# Patient Record
Sex: MALE | Race: WHITE | NOT HISPANIC OR LATINO | Employment: FULL TIME | URBAN - METROPOLITAN AREA
[De-identification: names, ages, dates, MRNs, and addresses within clinical notes are randomized per-mention and may not be internally consistent; named-entity substitution may affect disease eponyms.]

---

## 2017-11-13 ENCOUNTER — HOSPITAL ENCOUNTER (OUTPATIENT)
Dept: LAB | Facility: MEDICAL CENTER | Age: 32
End: 2017-11-13
Attending: FAMILY MEDICINE
Payer: COMMERCIAL

## 2017-11-13 LAB
ALBUMIN SERPL BCP-MCNC: 4.9 G/DL (ref 3.2–4.9)
ALBUMIN/GLOB SERPL: 1.7 G/DL
ALP SERPL-CCNC: 70 U/L (ref 30–99)
ALT SERPL-CCNC: 31 U/L (ref 2–50)
ANION GAP SERPL CALC-SCNC: 11 MMOL/L (ref 0–11.9)
AST SERPL-CCNC: 22 U/L (ref 12–45)
BASOPHILS # BLD AUTO: 0.6 % (ref 0–1.8)
BASOPHILS # BLD: 0.05 K/UL (ref 0–0.12)
BILIRUB SERPL-MCNC: 1 MG/DL (ref 0.1–1.5)
BUN SERPL-MCNC: 19 MG/DL (ref 8–22)
CALCIUM SERPL-MCNC: 9.5 MG/DL (ref 8.5–10.5)
CHLORIDE SERPL-SCNC: 104 MMOL/L (ref 96–112)
CHOLEST SERPL-MCNC: 220 MG/DL (ref 100–199)
CO2 SERPL-SCNC: 22 MMOL/L (ref 20–33)
CREAT SERPL-MCNC: 1.06 MG/DL (ref 0.5–1.4)
CRP SERPL HS-MCNC: 0.9 MG/L (ref 0–7.5)
EOSINOPHIL # BLD AUTO: 0.11 K/UL (ref 0–0.51)
EOSINOPHIL NFR BLD: 1.3 % (ref 0–6.9)
ERYTHROCYTE [DISTWIDTH] IN BLOOD BY AUTOMATED COUNT: 38.9 FL (ref 35.9–50)
GFR SERPL CREATININE-BSD FRML MDRD: >60 ML/MIN/1.73 M 2
GLOBULIN SER CALC-MCNC: 2.9 G/DL (ref 1.9–3.5)
GLUCOSE SERPL-MCNC: 88 MG/DL (ref 65–99)
HCT VFR BLD AUTO: 46.4 % (ref 42–52)
HDLC SERPL-MCNC: 46 MG/DL
HGB BLD-MCNC: 16.5 G/DL (ref 14–18)
IMM GRANULOCYTES # BLD AUTO: 0.07 K/UL (ref 0–0.11)
IMM GRANULOCYTES NFR BLD AUTO: 0.8 % (ref 0–0.9)
LDLC SERPL CALC-MCNC: ABNORMAL MG/DL
LYMPHOCYTES # BLD AUTO: 2.3 K/UL (ref 1–4.8)
LYMPHOCYTES NFR BLD: 26.3 % (ref 22–41)
MAGNESIUM SERPL-MCNC: 2 MG/DL (ref 1.5–2.5)
MCH RBC QN AUTO: 32.2 PG (ref 27–33)
MCHC RBC AUTO-ENTMCNC: 35.6 G/DL (ref 33.7–35.3)
MCV RBC AUTO: 90.6 FL (ref 81.4–97.8)
MONOCYTES # BLD AUTO: 0.94 K/UL (ref 0–0.85)
MONOCYTES NFR BLD AUTO: 10.7 % (ref 0–13.4)
NEUTROPHILS # BLD AUTO: 5.29 K/UL (ref 1.82–7.42)
NEUTROPHILS NFR BLD: 60.3 % (ref 44–72)
NRBC # BLD AUTO: 0 K/UL
NRBC BLD AUTO-RTO: 0 /100 WBC
PLATELET # BLD AUTO: 257 K/UL (ref 164–446)
PMV BLD AUTO: 10.9 FL (ref 9–12.9)
POTASSIUM SERPL-SCNC: 3.8 MMOL/L (ref 3.6–5.5)
PROT SERPL-MCNC: 7.8 G/DL (ref 6–8.2)
RBC # BLD AUTO: 5.12 M/UL (ref 4.7–6.1)
SODIUM SERPL-SCNC: 137 MMOL/L (ref 135–145)
TRIGL SERPL-MCNC: 621 MG/DL (ref 0–149)
WBC # BLD AUTO: 8.8 K/UL (ref 4.8–10.8)

## 2017-11-13 PROCEDURE — 80061 LIPID PANEL: CPT

## 2017-11-13 PROCEDURE — 86141 C-REACTIVE PROTEIN HS: CPT

## 2017-11-13 PROCEDURE — 83735 ASSAY OF MAGNESIUM: CPT

## 2017-11-13 PROCEDURE — 85025 COMPLETE CBC W/AUTO DIFF WBC: CPT

## 2017-11-13 PROCEDURE — 36415 COLL VENOUS BLD VENIPUNCTURE: CPT

## 2017-11-13 PROCEDURE — 80053 COMPREHEN METABOLIC PANEL: CPT

## 2021-12-17 ENCOUNTER — OFFICE VISIT (OUTPATIENT)
Dept: URGENT CARE | Facility: PHYSICIAN GROUP | Age: 36
End: 2021-12-17
Payer: COMMERCIAL

## 2021-12-17 VITALS
WEIGHT: 239 LBS | HEIGHT: 72 IN | BODY MASS INDEX: 32.37 KG/M2 | OXYGEN SATURATION: 99 % | HEART RATE: 93 BPM | SYSTOLIC BLOOD PRESSURE: 178 MMHG | DIASTOLIC BLOOD PRESSURE: 130 MMHG | TEMPERATURE: 99.5 F | RESPIRATION RATE: 16 BRPM

## 2021-12-17 DIAGNOSIS — I15.9 SECONDARY HYPERTENSION: ICD-10-CM

## 2021-12-17 DIAGNOSIS — B96.89 ACUTE BACTERIAL SINUSITIS: ICD-10-CM

## 2021-12-17 DIAGNOSIS — J01.90 ACUTE BACTERIAL SINUSITIS: ICD-10-CM

## 2021-12-17 PROBLEM — I10 HYPERTENSION: Status: ACTIVE | Noted: 2017-11-10

## 2021-12-17 PROBLEM — E78.5 DYSLIPIDEMIA: Status: ACTIVE | Noted: 2017-11-10

## 2021-12-17 PROCEDURE — 99213 OFFICE O/P EST LOW 20 MIN: CPT | Performed by: PHYSICIAN ASSISTANT

## 2021-12-17 RX ORDER — DEXTROMETHORPHAN HYDROBROMIDE AND PROMETHAZINE HYDROCHLORIDE 15; 6.25 MG/5ML; MG/5ML
5 SYRUP ORAL EVERY 6 HOURS PRN
Qty: 118 ML | Refills: 0 | Status: SHIPPED | OUTPATIENT
Start: 2021-12-17 | End: 2021-12-24

## 2021-12-17 RX ORDER — AMOXICILLIN 500 MG/1
500 CAPSULE ORAL 2 TIMES DAILY
Qty: 14 CAPSULE | Refills: 0 | Status: SHIPPED | OUTPATIENT
Start: 2021-12-17 | End: 2021-12-24

## 2021-12-17 RX ORDER — LISINOPRIL 10 MG/1
10 TABLET ORAL DAILY
COMMUNITY
End: 2022-08-31 | Stop reason: SDUPTHER

## 2021-12-17 ASSESSMENT — ENCOUNTER SYMPTOMS
SHORTNESS OF BREATH: 0
DIARRHEA: 0
HEADACHES: 0
NAUSEA: 0
VOMITING: 0
EYE PAIN: 0
MYALGIAS: 0
CONSTIPATION: 0
SINUS PAIN: 1
CHILLS: 0
COUGH: 1
SORE THROAT: 0
ABDOMINAL PAIN: 0
FEVER: 0

## 2021-12-17 NOTE — PROGRESS NOTES
Subjective:   Jaydon Oglesby is a 36 y.o. male who presents for Cough (Productibve cough, post nasal drip. Onset 2 weeks. )      36-year-old male presents with a 2-week history of symptoms, began with more nonspecific URI however he has been left with worsening sinus pressure, congestion, postnasal drip.  He frequently coughs up his nasal drip.  Has not noted any fevers or chills.  He is noticed some mild left-sided ear fullness.  He denies any chest pain, shortness of breath, dizziness, tinnitus      Review of Systems   Constitutional: Negative for chills and fever.   HENT: Positive for congestion and sinus pain. Negative for ear pain and sore throat.    Eyes: Negative for pain.   Respiratory: Positive for cough. Negative for shortness of breath.    Cardiovascular: Negative for chest pain.   Gastrointestinal: Negative for abdominal pain, constipation, diarrhea, nausea and vomiting.   Genitourinary: Negative for dysuria.   Musculoskeletal: Negative for myalgias.   Skin: Negative for rash.   Neurological: Negative for headaches.       Medications, Allergies, and current problem list reviewed today in Epic.     Objective:     BP (!) 178/130 (BP Location: Left arm, Patient Position: Sitting, BP Cuff Size: Large adult)   Pulse 93   Temp 37.5 °C (99.5 °F) (Temporal)   Resp 16   Ht 1.829 m (6')   Wt 108 kg (239 lb)   SpO2 99%     Physical Exam  Vitals reviewed.   Constitutional:       Appearance: Normal appearance.   HENT:      Head: Normocephalic and atraumatic.      Right Ear: Tympanic membrane, ear canal and external ear normal.      Left Ear: Tympanic membrane, ear canal and external ear normal.      Nose: Congestion and rhinorrhea present.      Comments: Maxillary sinus TTP     Mouth/Throat:      Mouth: Mucous membranes are moist.      Pharynx: No oropharyngeal exudate or posterior oropharyngeal erythema.      Comments: POST NASAL DRIP  Eyes:      Conjunctiva/sclera: Conjunctivae normal.   Cardiovascular:       Rate and Rhythm: Normal rate and regular rhythm.      Heart sounds: Normal heart sounds.   Pulmonary:      Effort: Pulmonary effort is normal.      Breath sounds: Normal breath sounds.   Lymphadenopathy:      Cervical: No cervical adenopathy.   Skin:     General: Skin is warm and dry.      Capillary Refill: Capillary refill takes less than 2 seconds.   Neurological:      Mental Status: He is alert and oriented to person, place, and time.         Assessment/Plan:     Diagnosis and associated orders:     1. Acute bacterial sinusitis  promethazine-dextromethorphan (PROMETHAZINE-DM) 6.25-15 MG/5ML syrup    amoxicillin (AMOXIL) 500 MG Cap   2. Secondary hypertension        Comments/MDM:     • Greater than 10 days of symptoms with worsening facial pressure and pain consistent with acute bacterial rhinosinusitis.  Patient with bothersome cough, will trial promethazine cough syrup, patient cautioned about the sedating side effects of this  • Patient with an alarmingly high blood pressure in clinic, he reports that he has severe whitecoat hypertension.  He actually has been monitoring his blood pressure at home and is consistently 130s over 90s.  Patient has no evidence of endorgan dysfunction and his symptoms are inconsistent with a venous sinus thrombosis or intracranial hypertension or another pathology.  I recommended the patient consider ER presentation if when he goes home he checks his blood pressure remains elevated in his uncontrolled setting.  He is quite nervous seeming.  Patient understands the risk of hypertension and the endorgan injury that can result from uncontrolled hypertensive emergency as he has been dealing with this for a number of years.  I recommend close follow-up with his primary care provider.  I offered ER evaluation at this time however he feels very strongly all his life he has had whitecoat hypertension         Differential diagnosis, natural history, supportive care, and indications for  immediate follow-up discussed.    Advised the patient to follow-up with the primary care physician for recheck, reevaluation, and consideration of further management.    Please note that this dictation was created using voice recognition software. I have made a reasonable attempt to correct obvious errors, but I expect that there are errors of grammar and possibly content that I did not discover before finalizing the note.    This note was electronically signed by Francesco Fuentes PA-C

## 2022-01-19 RX ORDER — AZITHROMYCIN 250 MG/1
TABLET, FILM COATED ORAL
Qty: 6 TABLET | Refills: 0 | Status: SHIPPED | OUTPATIENT
Start: 2022-01-19 | End: 2022-11-02

## 2022-01-19 NOTE — PROGRESS NOTES
Calling in Z-pack to pharmacy for him in case he gets bronchitis with current URI. Still waiting on his Covid test results from yesterday.

## 2022-01-26 PROBLEM — Z82.49 FAMILY HISTORY OF PREMATURE CORONARY ARTERY DISEASE: Status: ACTIVE | Noted: 2022-01-26

## 2022-07-21 ENCOUNTER — APPOINTMENT (OUTPATIENT)
Dept: MEDICAL GROUP | Facility: OTHER | Age: 37
End: 2022-07-21
Payer: COMMERCIAL

## 2022-08-31 ENCOUNTER — OFFICE VISIT (OUTPATIENT)
Dept: MEDICAL GROUP | Facility: OTHER | Age: 37
End: 2022-08-31
Payer: COMMERCIAL

## 2022-08-31 VITALS
HEIGHT: 72 IN | TEMPERATURE: 97.1 F | SYSTOLIC BLOOD PRESSURE: 146 MMHG | WEIGHT: 240 LBS | RESPIRATION RATE: 16 BRPM | OXYGEN SATURATION: 99 % | HEART RATE: 110 BPM | DIASTOLIC BLOOD PRESSURE: 98 MMHG | BODY MASS INDEX: 32.51 KG/M2

## 2022-08-31 DIAGNOSIS — Z82.49 FAMILY HISTORY OF PREMATURE CORONARY ARTERY DISEASE: ICD-10-CM

## 2022-08-31 DIAGNOSIS — Z30.09 FAMILY PLANNING COUNSELING: ICD-10-CM

## 2022-08-31 DIAGNOSIS — I10 PRIMARY HYPERTENSION: ICD-10-CM

## 2022-08-31 DIAGNOSIS — Z13.1 SCREENING FOR DIABETES MELLITUS: ICD-10-CM

## 2022-08-31 DIAGNOSIS — E78.5 DYSLIPIDEMIA: ICD-10-CM

## 2022-08-31 PROCEDURE — 99204 OFFICE O/P NEW MOD 45 MIN: CPT | Performed by: FAMILY MEDICINE

## 2022-08-31 RX ORDER — LISINOPRIL 10 MG/1
10 TABLET ORAL DAILY
Qty: 90 TABLET | Refills: 3 | Status: SHIPPED | OUTPATIENT
Start: 2022-08-31 | End: 2023-03-08 | Stop reason: SDUPTHER

## 2022-08-31 ASSESSMENT — PATIENT HEALTH QUESTIONNAIRE - PHQ9: CLINICAL INTERPRETATION OF PHQ2 SCORE: 0

## 2022-08-31 NOTE — PROGRESS NOTES
[facility] MEDICINE     PATIENT ID:  NAME:  Jaydon Oglesby  MRN:               [id number]  YOB: 1985    Date: 8:58 AM      CC:  re-establish care, blood pressure      HPI: Jaydon Oglesby is a 37 y.o. male who presented with multiple concerns.  Last seen about four years ago.     His father passed in early January at age 66.  It really depressed him for about six months.  Doing fine now.     Problem   Family Planning Counseling    Interested in a vasectomy.  They have 2 children ages 6 and 2.      Family History of Premature Coronary Artery Disease    HIs father  just in January from heart attack; he had a prior CABG in his early fifties.  His grandfather  at age 52 of heart disease.      Dyslipidemia    Has not had any recent labs.  Has never been on a statin.  Father had premature coronary artery disease.  Has started paying more attention to his diet in last month or so.      Hypertension    Has had hypertension for several years and was started on Lisinopril 10 mg which he ran out of a year ago.  Has bene taking nothing.  HIs father and mother both have had hypertension.  Father  earlier this year of heart disease.  No recent labs.           REVIEW OF SYSTEMS:   Ten systems reviewed and were negative except as noted in the HPI.                PROBLEM LIST  Patient Active Problem List   Diagnosis    Deviated nasal septum    Dyslipidemia    Hypertension    Family history of premature coronary artery disease    Family planning counseling        PAST SURGICAL HISTORY:  History reviewed. No pertinent surgical history.    FAMILY HISTORY:  History reviewed. No pertinent family history.    SOCIAL HISTORY:   Social History     Tobacco Use    Smoking status: Never    Smokeless tobacco: Never   Substance Use Topics    Alcohol use: Yes     Alcohol/week: 3.0 oz     Types: 5 Standard drinks or equivalent per week       ALLERGIES:  Allergies   Allergen Reactions    Ceclor [Cefaclor] Hives       OUTPATIENT  MEDICATIONS:    Current Outpatient Medications:     lisinopril (PRINIVIL) 10 MG Tab, Take 1 Tablet by mouth every day., Disp: 90 Tablet, Rfl: 3    azithromycin (ZITHROMAX) 250 MG Tab, Take 2 tabs by mouth on first day, then 1 tab daily for four more days. (Patient not taking: Reported on 8/31/2022), Disp: 6 Tablet, Rfl: 0    PHYSICAL EXAM:  Vitals:    08/31/22 0828   BP: (!) 146/98   BP Location: Left arm   Patient Position: Sitting   BP Cuff Size: Large adult   Pulse: (!) 110   Resp: 16   Temp: 36.2 °C (97.1 °F)   TempSrc: Temporal   SpO2: 99%   Weight: 109 kg (240 lb)   Height: 1.829 m (6')       General: Pt resting in NAD, cooperative   Skin:  Pink, warm and dry.  HEENT: NC/AT. EOMI.  Lungs:  Symmetrical.  CTAB, good air movement   Cardiovascular:  S1/S2 RRR   Extremities:  Full range of motion.  CNS:  Muscle tone is normal. No gross focal neurologic deficits      ASSESSMENT/PLAN:   37 y.o. male     Problem List Items Addressed This Visit       Dyslipidemia     Lipid panel ordered.   Offered to refer to Nutrition.   Discussed healthful diet, exercise.          Relevant Orders    REFERRAL TO CARDIOLOGY    Lipid Profile    Family history of premature coronary artery disease     Cardiology referral.   Discussed healthful diet, exercise.   Exercise: 150 minutes a week of vigorous exercise is recommended for good health.   Consider trying Yoga and/or Meditation for your health.         Relevant Orders    REFERRAL TO CARDIOLOGY    Lipid Profile    Family planning counseling     Urology referral for Vasectomy.          Relevant Orders    Referral to Urology    Hypertension     Will start his Lisinopril at 10 mg and have him follow up more closely.   May refill this maintenance medication for one year as needed.    Labs ordered --- CMP, Lipids, A1c.   Declines to see a Nutritionist at this time.  Discussed low salt diet.           Relevant Medications    lisinopril (PRINIVIL) 10 MG Tab    Other Relevant Orders     REFERRAL TO CARDIOLOGY    Comp Metabolic Panel    CBC WITH DIFFERENTIAL    Lipid Profile     Other Visit Diagnoses       Screening for diabetes mellitus        Relevant Orders    HEMOGLOBIN A1C          Repeat /96.     Doing well mentally now following his father's death.     Nnamdi Cueto MD  [facility]

## 2022-08-31 NOTE — ASSESSMENT & PLAN NOTE
Will start his Lisinopril at 10 mg and have him follow up more closely.   May refill this maintenance medication for one year as needed.    Labs ordered --- CMP, Lipids, A1c.   Declines to see a Nutritionist at this time.  Discussed low salt diet.

## 2022-08-31 NOTE — ASSESSMENT & PLAN NOTE
Cardiology referral.   Discussed healthful diet, exercise.   Exercise: 150 minutes a week of vigorous exercise is recommended for good health.   Consider trying Yoga and/or Meditation for your health.

## 2022-08-31 NOTE — PATIENT INSTRUCTIONS
Lisinopril refilled for one year.     Labs ordered.     Discussed healthful diet, exercise.     Exercise: 150 minutes a week of vigorous exercise is recommended for good health.     Consider trying Yoga and/or Meditation for your health.  Good also for lowering blood pressure.     Consider investing in a home BP cuff monitor.     May want to find a new GP closer to your home if needed.

## 2022-11-02 ENCOUNTER — OFFICE VISIT (OUTPATIENT)
Dept: CARDIOLOGY | Facility: MEDICAL CENTER | Age: 37
End: 2022-11-02
Attending: FAMILY MEDICINE
Payer: COMMERCIAL

## 2022-11-02 VITALS
OXYGEN SATURATION: 99 % | WEIGHT: 235 LBS | HEIGHT: 72 IN | HEART RATE: 118 BPM | BODY MASS INDEX: 31.83 KG/M2 | SYSTOLIC BLOOD PRESSURE: 158 MMHG | RESPIRATION RATE: 18 BRPM | DIASTOLIC BLOOD PRESSURE: 102 MMHG

## 2022-11-02 DIAGNOSIS — E78.5 DYSLIPIDEMIA: ICD-10-CM

## 2022-11-02 DIAGNOSIS — I10 PRIMARY HYPERTENSION: ICD-10-CM

## 2022-11-02 DIAGNOSIS — Z82.49 FAMILY HISTORY OF PREMATURE CORONARY ARTERY DISEASE: ICD-10-CM

## 2022-11-02 LAB — EKG IMPRESSION: NORMAL

## 2022-11-02 PROCEDURE — 99214 OFFICE O/P EST MOD 30 MIN: CPT | Performed by: NURSE PRACTITIONER

## 2022-11-02 PROCEDURE — 93000 ELECTROCARDIOGRAM COMPLETE: CPT | Performed by: INTERNAL MEDICINE

## 2022-11-02 ASSESSMENT — ENCOUNTER SYMPTOMS
ORTHOPNEA: 0
DIZZINESS: 0
SHORTNESS OF BREATH: 0
PALPITATIONS: 0
PND: 0
COUGH: 0
MYALGIAS: 0
FEVER: 0
ABDOMINAL PAIN: 0
CLAUDICATION: 0

## 2022-11-02 NOTE — PROGRESS NOTES
"Chief Complaint   Patient presents with    Hypertension    Dyslipidemia       Subjective     Jaydon Oglesby is a 37 y.o. male who presents today as a new patient.    Patient was referred to our office by his primary care for a history of high cholesterol, high blood pressure and family medical history of heart disease.    Patient reports his father recently  of multiple problems at age 65, but likely stemming from heart disease.  Patient is unsure of what diagnosis his father had.  Patient also has a history of high cholesterol and high blood pressure.  He was recently started on lisinopril by his PCP.    He eager to stay on top of his health problems.    Patient reports he does have a history of \"whitecoat syndrome\" and believes that his blood pressure runs elevated when he comes into the doctor's office.  He does admit to having a history of hypertension which he is currently being treated for.  He is anxious today because of everything that has gone on with his dad.    He denies chest pain, palpitations, orthopnea, PND, edema, shortness of breath or dizziness/lightheadedness.    He is exercising regularly, he walks 2 miles 4-5 times a week then boxes 2-3 times per week and rock climbs 2 times a week with his brother.  He started exercising regularly about 4 months ago and has lost about 5 pounds.    Patient denies any smoking or drug use.  He does drinks occasional alcohol.    He denies any other medical history.    Past Medical History:   Diagnosis Date    Family history of premature coronary artery disease 2022    Hypertension     Nil disease      History reviewed. No pertinent surgical history.  Family History   Problem Relation Age of Onset    Lymphoma Mother 53        Non-hodgkins     Social History     Socioeconomic History    Marital status:      Spouse name: Not on file    Number of children: Not on file    Years of education: Not on file    Highest education level: Not on file "   Occupational History    Not on file   Tobacco Use    Smoking status: Never    Smokeless tobacco: Never   Vaping Use    Vaping Use: Never used   Substance and Sexual Activity    Alcohol use: Yes     Alcohol/week: 3.0 oz     Types: 5 Standard drinks or equivalent per week     Comment: 4 drinks twice a week    Drug use: No    Sexual activity: Not on file   Other Topics Concern    Not on file   Social History Narrative    Not on file     Social Determinants of Health     Financial Resource Strain: Not on file   Food Insecurity: Not on file   Transportation Needs: Not on file   Physical Activity: Not on file   Stress: Not on file   Social Connections: Not on file   Intimate Partner Violence: Not on file   Housing Stability: Not on file     Allergies   Allergen Reactions    Ceclor [Cefaclor] Hives     Outpatient Encounter Medications as of 11/2/2022   Medication Sig Dispense Refill    lisinopril (PRINIVIL) 10 MG Tab Take 1 Tablet by mouth every day. 90 Tablet 3    [DISCONTINUED] azithromycin (ZITHROMAX) 250 MG Tab Take 2 tabs by mouth on first day, then 1 tab daily for four more days. (Patient not taking: No sig reported) 6 Tablet 0     No facility-administered encounter medications on file as of 11/2/2022.     Review of Systems   Constitutional:  Negative for fever and malaise/fatigue.   Respiratory:  Negative for cough and shortness of breath.    Cardiovascular:  Negative for chest pain, palpitations, orthopnea, claudication, leg swelling and PND.   Gastrointestinal:  Negative for abdominal pain.   Musculoskeletal:  Negative for myalgias.   Neurological:  Negative for dizziness.   All other systems reviewed and are negative.           Objective     BP (!) 158/102 (BP Location: Left arm, Patient Position: Sitting, BP Cuff Size: Adult)   Pulse (!) 118   Resp 18   Ht 1.829 m (6')   Wt 107 kg (235 lb)   SpO2 99%   BMI 31.87 kg/m²     Physical Exam  Vitals reviewed.   Constitutional:       Appearance: He is  well-developed.   HENT:      Head: Normocephalic and atraumatic.   Eyes:      Pupils: Pupils are equal, round, and reactive to light.   Neck:      Vascular: No JVD.   Cardiovascular:      Rate and Rhythm: Normal rate and regular rhythm.      Heart sounds: Normal heart sounds.   Pulmonary:      Effort: Pulmonary effort is normal. No respiratory distress.      Breath sounds: Normal breath sounds. No wheezing or rales.   Abdominal:      General: Bowel sounds are normal.      Palpations: Abdomen is soft.   Musculoskeletal:         General: Normal range of motion.      Cervical back: Normal range of motion and neck supple.      Right lower leg: No edema.      Left lower leg: No edema.   Skin:     General: Skin is warm and dry.   Neurological:      General: No focal deficit present.      Mental Status: He is alert and oriented to person, place, and time.   Psychiatric:         Behavior: Behavior normal.     Lab Results   Component Value Date/Time    CHOLSTRLTOT 220 (H) 11/13/2017 04:19 PM    LDL see below 11/13/2017 04:19 PM    HDL 46 11/13/2017 04:19 PM    TRIGLYCERIDE 621 (H) 11/13/2017 04:19 PM       Lab Results   Component Value Date/Time    SODIUM 137 11/13/2017 04:19 PM    POTASSIUM 3.8 11/13/2017 04:19 PM    CHLORIDE 104 11/13/2017 04:19 PM    CO2 22 11/13/2017 04:19 PM    GLUCOSE 88 11/13/2017 04:19 PM    BUN 19 11/13/2017 04:19 PM    CREATININE 1.06 11/13/2017 04:19 PM     Lab Results   Component Value Date/Time    ALKPHOSPHAT 70 11/13/2017 04:19 PM    ASTSGOT 22 11/13/2017 04:19 PM    ALTSGPT 31 11/13/2017 04:19 PM    TBILIRUBIN 1.0 11/13/2017 04:19 PM               Assessment & Plan     1. Primary hypertension  EKG    CT-CARDIAC SCORING    EC-ECHOCARDIOGRAM COMPLETE W/O CONT      2. Dyslipidemia  EKG    CT-CARDIAC SCORING    EC-ECHOCARDIOGRAM COMPLETE W/O CONT      3. Family history of premature coronary artery disease  CT-CARDIAC SCORING          Medical Decision Making: Today's Assessment/Status/Plan:         Patient had labs that were drawn this morning, will await results of testing before we make recommendations.  Patient will try to notify me once results become available.  He does have a history of high cholesterol.  But those results are back from 2017    Recommended patient to obtain a CT coronary calcium score and an echocardiogram for baseline testing.  He is agreeable and will complete the tests.    For his blood pressure, patient will purchase a blood pressure cuff and start monitoring his blood pressures at home.  If BPs are greater than 140s to 150s, patient to notify our office for recommendations.  This can be done through [employer].    FU in clinic in 3-4 months with echo and CT cardiac score. Sooner if needed.    Patient verbalizes understanding and agrees with the plan of care.     PLEASE NOTE: This Note was created using voice recognition Software. I have made every reasonable attempt to correct obvious errors, but I expect that there are errors of grammar and possibly content that I did not discover before finalizing the note

## 2022-11-03 ENCOUNTER — PATIENT MESSAGE (OUTPATIENT)
Dept: CARDIOLOGY | Facility: MEDICAL CENTER | Age: 37
End: 2022-11-03
Payer: COMMERCIAL

## 2022-11-03 DIAGNOSIS — E78.1 HIGH TRIGLYCERIDES: ICD-10-CM

## 2022-11-03 DIAGNOSIS — Z82.49 FAMILY HISTORY OF PREMATURE CORONARY ARTERY DISEASE: ICD-10-CM

## 2022-11-03 DIAGNOSIS — E78.5 DYSLIPIDEMIA: ICD-10-CM

## 2022-11-11 ENCOUNTER — PATIENT MESSAGE (OUTPATIENT)
Dept: CARDIOLOGY | Facility: MEDICAL CENTER | Age: 37
End: 2022-11-11
Payer: COMMERCIAL

## 2022-11-18 ENCOUNTER — HOSPITAL ENCOUNTER (OUTPATIENT)
Dept: RADIOLOGY | Facility: MEDICAL CENTER | Age: 37
End: 2022-11-18
Attending: NURSE PRACTITIONER
Payer: COMMERCIAL

## 2022-11-18 DIAGNOSIS — I10 PRIMARY HYPERTENSION: ICD-10-CM

## 2022-11-18 DIAGNOSIS — Z82.49 FAMILY HISTORY OF PREMATURE CORONARY ARTERY DISEASE: ICD-10-CM

## 2022-11-18 DIAGNOSIS — E78.5 DYSLIPIDEMIA: ICD-10-CM

## 2022-11-18 PROCEDURE — 4410556 CT-CARDIAC SCORING (SELF PAY ONLY)

## 2022-12-01 ENCOUNTER — TELEPHONE (OUTPATIENT)
Dept: CARDIOLOGY | Facility: MEDICAL CENTER | Age: 37
End: 2022-12-01
Payer: COMMERCIAL

## 2022-12-01 NOTE — TELEPHONE ENCOUNTER
MAR    Caller: Alison Oglesby (wife)    Topic/issue: STATIN QUESTIONS    Alison states that patient would like a mychart message to discuss the statin medication. Alison states that patient PCP is suggesting that he start the statin treatment as soon as possible. Please advise.    Thank you,  Cole HAYEDN    Callback Number: [phone] (home)

## 2023-02-02 ENCOUNTER — OFFICE VISIT (OUTPATIENT)
Dept: URGENT CARE | Facility: PHYSICIAN GROUP | Age: 38
End: 2023-02-02
Payer: COMMERCIAL

## 2023-02-02 VITALS
BODY MASS INDEX: 30.11 KG/M2 | OXYGEN SATURATION: 96 % | HEIGHT: 72 IN | HEART RATE: 132 BPM | RESPIRATION RATE: 16 BRPM | DIASTOLIC BLOOD PRESSURE: 102 MMHG | SYSTOLIC BLOOD PRESSURE: 142 MMHG | WEIGHT: 222.3 LBS | TEMPERATURE: 98 F

## 2023-02-02 DIAGNOSIS — B97.89 VIRAL RESPIRATORY INFECTION: ICD-10-CM

## 2023-02-02 DIAGNOSIS — J98.8 VIRAL RESPIRATORY INFECTION: ICD-10-CM

## 2023-02-02 PROCEDURE — 99213 OFFICE O/P EST LOW 20 MIN: CPT | Performed by: NURSE PRACTITIONER

## 2023-02-02 RX ORDER — BENZONATATE 100 MG/1
100 CAPSULE ORAL 3 TIMES DAILY PRN
Qty: 30 CAPSULE | Refills: 0 | Status: SHIPPED | OUTPATIENT
Start: 2023-02-02 | End: 2023-03-08

## 2023-02-02 RX ORDER — ALBUTEROL SULFATE 90 UG/1
1-2 AEROSOL, METERED RESPIRATORY (INHALATION) EVERY 4 HOURS PRN
Qty: 8.5 G | Refills: 0 | Status: SHIPPED | OUTPATIENT
Start: 2023-02-02 | End: 2023-03-08

## 2023-02-02 ASSESSMENT — ENCOUNTER SYMPTOMS
HEMOPTYSIS: 0
COUGH: 1
NAUSEA: 0
SPUTUM PRODUCTION: 1
FEVER: 1
DIARRHEA: 1
SHORTNESS OF BREATH: 0
VOMITING: 0
SORE THROAT: 1
CHILLS: 1
WHEEZING: 0

## 2023-02-02 ASSESSMENT — FIBROSIS 4 INDEX: FIB4 SCORE: 0.82

## 2023-02-02 NOTE — PROGRESS NOTES
Subjective:     Jaydon Oglesby is a 37 y.o. male who presents for Bronchitis (Spasms, 2 weeks, ), Cough (Persistent, Negative COVID x 2, OTC HBP Cold Medication, denies SOB), Fever (Low grade), and Chills      Cough worsened yesterday. Taking OTC cold medication.  Negative covid testing X 2.  States his HR was not elevated proir to coming in, states he has PTSD that contributed to the elevation. Was 70 at rest. Currently seeing a cardiologist. Has not needed inhaler recently.     Cough  The current episode started 1 to 4 weeks ago. The problem has been gradually worsening. Associated symptoms include chills, a fever and a sore throat. Pertinent negatives include no chest pain, ear pain, hemoptysis, shortness of breath or wheezing.   Fever   Associated symptoms include congestion, coughing, diarrhea and a sore throat. Pertinent negatives include no chest pain, ear pain, nausea, vomiting or wheezing.   Chills  Associated symptoms include chills, congestion, coughing, a fever and a sore throat. Pertinent negatives include no chest pain, nausea or vomiting.     Past Medical History:   Diagnosis Date    Family history of premature coronary artery disease 01/26/2022    Hypertension     Nil disease        History reviewed. No pertinent surgical history.    Social History     Socioeconomic History    Marital status:      Spouse name: Not on file    Number of children: Not on file    Years of education: Not on file    Highest education level: Not on file   Occupational History    Not on file   Tobacco Use    Smoking status: Never    Smokeless tobacco: Never   Vaping Use    Vaping Use: Never used   Substance and Sexual Activity    Alcohol use: Yes     Alcohol/week: 3.0 oz     Types: 5 Standard drinks or equivalent per week     Comment: 4 drinks twice a week    Drug use: No    Sexual activity: Not on file   Other Topics Concern    Not on file   Social History Narrative    Not on file     Social Determinants of Health      Financial Resource Strain: Not on file   Food Insecurity: Not on file   Transportation Needs: Not on file   Physical Activity: Not on file   Stress: Not on file   Social Connections: Not on file   Intimate Partner Violence: Not on file   Housing Stability: Not on file        Family History   Problem Relation Age of Onset    Lymphoma Mother 53        Non-hodgkins        Allergies   Allergen Reactions    Ceclor [Cefaclor] Hives       Review of Systems   Constitutional:  Positive for chills, fever and malaise/fatigue.   HENT:  Positive for congestion and sore throat. Negative for ear pain.    Respiratory:  Positive for cough and sputum production. Negative for hemoptysis, shortness of breath and wheezing.    Cardiovascular:  Negative for chest pain.   Gastrointestinal:  Positive for diarrhea. Negative for nausea and vomiting.   All other systems reviewed and are negative.     Objective:   BP (!) 142/102   Pulse (!) 132   Temp 36.7 °C (98 °F) (Temporal)   Resp 16   Ht 1.829 m (6')   Wt 101 kg (222 lb 4.8 oz)   SpO2 96%   BMI 30.15 kg/m²     Physical Exam  Vitals reviewed.   Constitutional:       General: He is not in acute distress.     Appearance: He is well-developed. He is ill-appearing. He is not toxic-appearing.   HENT:      Head: Normocephalic and atraumatic.      Right Ear: External ear normal. No drainage or swelling. Tympanic membrane is not erythematous.      Left Ear: External ear normal. No drainage or swelling. Tympanic membrane is not erythematous.      Ears:      Comments: Cerumen bilaterally.      Nose: Rhinorrhea present.      Comments: Chapped nares.      Mouth/Throat:      Mouth: Mucous membranes are moist.      Pharynx: Posterior oropharyngeal erythema present.   Eyes:      Conjunctiva/sclera: Conjunctivae normal.   Cardiovascular:      Rate and Rhythm: Regular rhythm. Tachycardia present.   Pulmonary:      Effort: Pulmonary effort is normal. No accessory muscle usage, prolonged  expiration, respiratory distress or retractions.      Breath sounds: No stridor. No decreased breath sounds, rhonchi or rales.      Comments: Persistent coughing.  Musculoskeletal:         General: Normal range of motion.      Cervical back: Normal range of motion.   Skin:     General: Skin is warm and dry.      Findings: No rash.   Neurological:      Mental Status: He is alert and oriented to person, place, and time.      GCS: GCS eye subscore is 4. GCS verbal subscore is 5. GCS motor subscore is 6.   Psychiatric:         Speech: Speech normal.         Behavior: Behavior normal.         Thought Content: Thought content normal.         Judgment: Judgment normal.       Assessment/Plan:   1. Viral respiratory infection  - benzonatate (TESSALON) 100 MG Cap; Take 1 Capsule by mouth 3 times a day as needed for Cough.  Dispense: 30 Capsule; Refill: 0  - albuterol 108 (90 Base) MCG/ACT Aero Soln inhalation aerosol; Inhale 1-2 Puffs every four hours as needed for Shortness of Breath.  Dispense: 8.5 g; Refill: 0  Symptomatic care.  -Oral hydration and rest.   -Cough control: nonpharmacologic options for cough relief such as throat lozenges, hot tea, honey.  -Over the counter expectorant as directed; Guaifenesin (Mucinex).  -Tylenol or ibuprofen for pain and fever as directed.   -Warm salt water gargles.  -OTC Throat lozenges or spray (Cepacol).    Seek emergency medical care immediately for: Trouble breathing, persistent pain or pressure in the chest, confusion, inability to wake or stay awake, bluish lips or face, persistent tachycardia (fast heart rate), prolonged dizziness, persistent high grade fevers. Follow up for prolonged cough, persistent wheezing, persistent throat pain, difficulty swallowing, persistent fevers, leg swelling, or any other concerns. Follow up with your Primary Care Provider.     -Discussed viral etiology. Negative COVID testing. Discussed S&S of PNA with follow up. Stable Vitals. No wheezing.      Differential diagnosis, natural history, supportive care, and indications for immediate follow-up discussed.

## 2023-03-01 ENCOUNTER — TELEPHONE (OUTPATIENT)
Dept: CARDIOLOGY | Facility: MEDICAL CENTER | Age: 38
End: 2023-03-01
Payer: COMMERCIAL

## 2023-03-01 NOTE — TELEPHONE ENCOUNTER
LVM for pt in regards to lab work that was ordered at previous OV with JAROD Scott. Office number given for call back if pt has any questions or has had lab work done outside of [facility]. Pt has follow up appointment scheduled with JAROD Scott.

## 2023-03-06 ENCOUNTER — HOSPITAL ENCOUNTER (OUTPATIENT)
Dept: CARDIOLOGY | Facility: MEDICAL CENTER | Age: 38
End: 2023-03-06
Attending: NURSE PRACTITIONER
Payer: COMMERCIAL

## 2023-03-06 DIAGNOSIS — I10 PRIMARY HYPERTENSION: ICD-10-CM

## 2023-03-06 DIAGNOSIS — E78.5 DYSLIPIDEMIA: ICD-10-CM

## 2023-03-06 LAB
LV EJECT FRACT  99904: 60
LV EJECT FRACT MOD 2C 99903: 61.15
LV EJECT FRACT MOD 4C 99902: 63.95
LV EJECT FRACT MOD BP 99901: 61.38

## 2023-03-06 PROCEDURE — 93306 TTE W/DOPPLER COMPLETE: CPT

## 2023-03-06 PROCEDURE — 93306 TTE W/DOPPLER COMPLETE: CPT | Mod: 26 | Performed by: INTERNAL MEDICINE

## 2023-03-07 ASSESSMENT — FIBROSIS 4 INDEX: FIB4 SCORE: 0.82

## 2023-03-08 ENCOUNTER — TELEMEDICINE (OUTPATIENT)
Dept: CARDIOLOGY | Facility: MEDICAL CENTER | Age: 38
End: 2023-03-08
Payer: COMMERCIAL

## 2023-03-08 VITALS
HEIGHT: 72 IN | WEIGHT: 225 LBS | DIASTOLIC BLOOD PRESSURE: 105 MMHG | SYSTOLIC BLOOD PRESSURE: 140 MMHG | BODY MASS INDEX: 30.48 KG/M2

## 2023-03-08 DIAGNOSIS — E78.1 HIGH TRIGLYCERIDES: ICD-10-CM

## 2023-03-08 DIAGNOSIS — Z82.49 FAMILY HISTORY OF PREMATURE CORONARY ARTERY DISEASE: ICD-10-CM

## 2023-03-08 DIAGNOSIS — E78.5 DYSLIPIDEMIA: ICD-10-CM

## 2023-03-08 DIAGNOSIS — I10 PRIMARY HYPERTENSION: ICD-10-CM

## 2023-03-08 PROCEDURE — 99214 OFFICE O/P EST MOD 30 MIN: CPT | Mod: 95 | Performed by: NURSE PRACTITIONER

## 2023-03-08 RX ORDER — LISINOPRIL 20 MG/1
20 TABLET ORAL DAILY
Qty: 90 TABLET | Refills: 3 | Status: SHIPPED
Start: 2023-03-08 | End: 2023-06-21 | Stop reason: CLARIF

## 2023-03-08 ASSESSMENT — ENCOUNTER SYMPTOMS
DIZZINESS: 0
CLAUDICATION: 0
COUGH: 0
FEVER: 0
PND: 0
ABDOMINAL PAIN: 0
PALPITATIONS: 0
MYALGIAS: 0
ORTHOPNEA: 0
SHORTNESS OF BREATH: 0

## 2023-03-09 NOTE — PROGRESS NOTES
Chief Complaint   Patient presents with    Hypertension     F/V DX: Primary hypertension       Subjective     Jaydon Oglesby is a 37 y.o. male who presents today for follow-up on his testing, hypertension and family history of CAD.    He was initially seen in our office on 11/2/2022.  During that visit, patient was sent for CT coronary artery calcium score, echocardiogram and repeat lipid panel and CMP.    Since his last visit, patient has started exercising and has lost weight.  He was weighing around 240 pounds and is now down to 225 pounds.    He reports walking on the treadmill for about 30 minutes around 4 miles and does fitness boxing class II-III days a week.  He reports a good diet.    He had lab testing done earlier this week [facility].    Patient reports he is feeling well, denies chest pain, palpitations, orthopnea, PND, edema, shortness of breath or dizziness/lightheadedness.    His home blood pressures continue to range about the 140s over 100    He eager to stay on top of his health problems.    Patient denies any smoking or drug use.  He does drinks occasional alcohol.    He denies any other medical history.    Family medical history of his father recently dying of multiple problems at age 65, but likely stemming from heart disease.  Patient is unsure of what diagnosis his father had.    Past Medical History:   Diagnosis Date    Family history of premature coronary artery disease 01/26/2022    Hypertension     Nil disease      History reviewed. No pertinent surgical history.  Family History   Problem Relation Age of Onset    Lymphoma Mother 53        Non-hodgkins     Social History     Socioeconomic History    Marital status:      Spouse name: Not on file    Number of children: Not on file    Years of education: Not on file    Highest education level: Not on file   Occupational History    Not on file   Tobacco Use    Smoking status: Never    Smokeless tobacco: Never   Vaping Use    Vaping Use:  Never used   Substance and Sexual Activity    Alcohol use: Yes     Alcohol/week: 3.0 oz     Types: 5 Standard drinks or equivalent per week     Comment: 4 drinks twice a week    Drug use: No    Sexual activity: Not on file   Other Topics Concern    Not on file   Social History Narrative    Not on file     Social Determinants of Health     Financial Resource Strain: Not on file   Food Insecurity: Not on file   Transportation Needs: Not on file   Physical Activity: Not on file   Stress: Not on file   Social Connections: Not on file   Intimate Partner Violence: Not on file   Housing Stability: Not on file     Allergies   Allergen Reactions    Ceclor [Cefaclor] Hives     Outpatient Encounter Medications as of 3/8/2023   Medication Sig Dispense Refill    lisinopril (PRINIVIL) 20 MG Tab Take 1 Tablet by mouth every day. 90 Tablet 3    benzonatate (TESSALON) 100 MG Cap Take 1 Capsule by mouth 3 times a day as needed for Cough. (Patient not taking: Reported on 3/8/2023) 30 Capsule 0    albuterol 108 (90 Base) MCG/ACT Aero Soln inhalation aerosol Inhale 1-2 Puffs every four hours as needed for Shortness of Breath. (Patient not taking: Reported on 3/8/2023) 8.5 g 0    [DISCONTINUED] lisinopril (PRINIVIL) 10 MG Tab Take 1 Tablet by mouth every day. 90 Tablet 3     No facility-administered encounter medications on file as of 3/8/2023.     Review of Systems   Constitutional:  Negative for fever and malaise/fatigue.   Respiratory:  Negative for cough and shortness of breath.    Cardiovascular:  Negative for chest pain, palpitations, orthopnea, claudication, leg swelling and PND.   Gastrointestinal:  Negative for abdominal pain.   Musculoskeletal:  Negative for myalgias.   Neurological:  Negative for dizziness.   All other systems reviewed and are negative.           Objective     BP (!) 140/105 (BP Location: Left arm, Patient Position: Sitting, BP Cuff Size: Adult)   Ht 1.829 m (6')   Wt 102 kg (225 lb)   BMI 30.52 kg/m²      Physical Exam  Vitals reviewed.   Constitutional:       Appearance: He is well-developed.   HENT:      Head: Normocephalic and atraumatic.   Eyes:      Pupils: Pupils are equal, round, and reactive to light.   Neck:      Vascular: No JVD.   Cardiovascular:      Rate and Rhythm: Normal rate and regular rhythm.      Heart sounds: Normal heart sounds.   Pulmonary:      Effort: Pulmonary effort is normal. No respiratory distress.      Breath sounds: Normal breath sounds. No wheezing or rales.   Abdominal:      General: Bowel sounds are normal.      Palpations: Abdomen is soft.   Musculoskeletal:         General: Normal range of motion.      Cervical back: Normal range of motion and neck supple.      Right lower leg: No edema.      Left lower leg: No edema.   Skin:     General: Skin is warm and dry.   Neurological:      General: No focal deficit present.      Mental Status: He is alert and oriented to person, place, and time.   Psychiatric:         Behavior: Behavior normal.     Lab Results   Component Value Date/Time    CHOLSTRLTOT 286 (H) 11/02/2022 04:23 AM    CHOLSTRLTOT 220 (H) 11/13/2017 04:19 PM    LDL see below 11/13/2017 04:19 PM    HDL 45 11/02/2022 04:23 AM    HDL 46 11/13/2017 04:19 PM    TRIGLYCERIDE 520 (H) 11/02/2022 04:23 AM    TRIGLYCERIDE 621 (H) 11/13/2017 04:19 PM       Lab Results   Component Value Date/Time    SODIUM 137 11/02/2022 04:23 AM    SODIUM 137 11/13/2017 04:19 PM    POTASSIUM 4.4 11/02/2022 04:23 AM    POTASSIUM 3.8 11/13/2017 04:19 PM    CHLORIDE 103 11/02/2022 04:23 AM    CHLORIDE 104 11/13/2017 04:19 PM    CO2 20 11/02/2022 04:23 AM    CO2 22 11/13/2017 04:19 PM    GLUCOSE 109 (H) 11/02/2022 04:23 AM    GLUCOSE 88 11/13/2017 04:19 PM    BUN 13 11/02/2022 04:23 AM    BUN 19 11/13/2017 04:19 PM    CREATININE 1.06 11/02/2022 04:23 AM    CREATININE 1.06 11/13/2017 04:19 PM    BUNCREATRAT 12 11/02/2022 04:23 AM     Lab Results   Component Value Date/Time    ALKPHOSPHAT 77 11/02/2022  04:23 AM    ALKPHOSPHAT 70 11/13/2017 04:19 PM    ASTSGOT 22 11/02/2022 04:23 AM    ASTSGOT 22 11/13/2017 04:19 PM    ALTSGPT 23 11/02/2022 04:23 AM    ALTSGPT 31 11/13/2017 04:19 PM    TBILIRUBIN 1.0 11/02/2022 04:23 AM    TBILIRUBIN 1.0 11/13/2017 04:19 PM      CT cardiac score 11/18/2022  FINDINGS:     Coronary calcification:  LMA - 0.0  LCX - 0.0  LAD - 29.6  RCA - 0.0     Total Calcium Score: 29.6     Percentile: Calcium score is above the 90th percentile for the patient's age and sex.     Other findings:  Heart: Normal size.  Lungs: Calcified granuloma in the right lower lobe.  Mediastinum: A large fat-containing morganii hernia. This mildly flattens the anterior aspect of the right ventricle..  Upper abdomen: Normal.     IMPRESSION:     Calcium Score of 1-99 AND >75th percentile:     You have significant cholesterol (plaque) build-up in the arteries that supply blood flow to your heart. This does not mean you have any blockages in your arteries that require an intervention at this time, but you are at higher risk of developing heart disease or a stroke. Therefore, you need to be aggressive about preventing further plaque build-up. We recommend treating this plaque with a healthy low saturated fat, low sugar, mostly plant based diet and regular exercise. We highly recommend the use   of aspirin (low dose, 81 mg once a day) and a cholesterol medication to help prevent further plaque build-up; please discuss these recommendations with your doctor. If you smoke, this likely has contributed to your cholesterol build-up, and you should quit as soon as possible. Please let your doctor know if you need medications or other resources to help you quit. If you are overweight, we also recommend gradual weight loss until you reach a normal weight. Each of these recommendations will lower your   future risk of heart disease and stroke, and can even help decrease the amount of plaque you currently have. We do not routinely  recommend any further testing based on this result.     Guidelines based upon the American College of Cardiology 2018 recommendations.       Transthoracic Echo Report 3/6/2023  Normal left ventricular size, wall thickness, and systolic function.  The right ventricle is normal in size and systolic function.  The left atrium is normal in size.  No pericardial effusion.     No prior study is available for comparison.            Assessment & Plan     1. Primary hypertension  lisinopril (PRINIVIL) 20 MG Tab      2. High triglycerides        3. Dyslipidemia        4. Family history of premature coronary artery disease            Medical Decision Making: Today's Assessment/Status/Plan:        Results of CT coronary artery calcium score and echo reviewed with patient    Lab test requested from [facility] for his CMP and lipid panel    If there is improvement on his cholesterol, I do not believe need to start statin therapy at this time, but if no significant improvement, will start patient on statin therapy.    As for his blood pressure, his diastolic BP continues to be elevated, recommend better control with goal <130/80.  Recommend patient to increase lisinopril to 20 mg daily    FU in clinic in 3 months.  If stable at next visit, we will send patient back to his PCP.  Sooner if needed.    Patient verbalizes understanding and agrees with the plan of care.     PLEASE NOTE: This Note was created using voice recognition Software. I have made every reasonable attempt to correct obvious errors, but I expect that there are errors of grammar and possibly content that I did not discover before finalizing the note

## 2023-06-21 ENCOUNTER — TELEMEDICINE (OUTPATIENT)
Dept: CARDIOLOGY | Facility: MEDICAL CENTER | Age: 38
End: 2023-06-21
Attending: NURSE PRACTITIONER
Payer: COMMERCIAL

## 2023-06-21 VITALS
SYSTOLIC BLOOD PRESSURE: 133 MMHG | DIASTOLIC BLOOD PRESSURE: 94 MMHG | BODY MASS INDEX: 29.53 KG/M2 | HEIGHT: 72 IN | WEIGHT: 218 LBS | HEART RATE: 70 BPM

## 2023-06-21 DIAGNOSIS — E78.5 DYSLIPIDEMIA: ICD-10-CM

## 2023-06-21 DIAGNOSIS — Z82.49 FAMILY HISTORY OF PREMATURE CORONARY ARTERY DISEASE: ICD-10-CM

## 2023-06-21 DIAGNOSIS — I10 PRIMARY HYPERTENSION: ICD-10-CM

## 2023-06-21 DIAGNOSIS — N52.8 OTHER MALE ERECTILE DYSFUNCTION: ICD-10-CM

## 2023-06-21 PROCEDURE — 99214 OFFICE O/P EST MOD 30 MIN: CPT | Mod: 95 | Performed by: NURSE PRACTITIONER

## 2023-06-21 RX ORDER — TADALAFIL 10 MG/1
10 TABLET ORAL
Qty: 30 TABLET | Refills: 11 | Status: SHIPPED | OUTPATIENT
Start: 2023-06-21

## 2023-06-21 RX ORDER — LISINOPRIL AND HYDROCHLOROTHIAZIDE 20; 12.5 MG/1; MG/1
1 TABLET ORAL DAILY
Qty: 90 TABLET | Refills: 3 | Status: SHIPPED | OUTPATIENT
Start: 2023-06-21

## 2023-06-21 ASSESSMENT — ENCOUNTER SYMPTOMS
PND: 0
ORTHOPNEA: 0
PALPITATIONS: 0
ABDOMINAL PAIN: 0
SHORTNESS OF BREATH: 0
MYALGIAS: 0
FEVER: 0
COUGH: 0
CLAUDICATION: 0
DIZZINESS: 0

## 2023-06-21 ASSESSMENT — FIBROSIS 4 INDEX: FIB4 SCORE: 0.84

## 2023-06-21 NOTE — PROGRESS NOTES
Chief Complaint   Patient presents with    Hypertension     F/V DX: Primary hypertension       Subjective   This evaluation was conducted via Zoom using secure and encrypted videoconferencing technology. The patient was in their home in the [facility].    The patient's identity was confirmed and verbal consent was obtained for this virtual visit.     Jaydon Oglesby is a 38 y.o. male who presents today for follow-up on his hypertension and family history of CAD.    He was last seen in clinic on 3/8/2023.  During that visit, he was recommended to increase lisinopril to 20 mg daily.  Patient reports no problems with the dose increase of the medication.    Patient currently has a cold.  He has questions about what medications to take.    He mentions having a sensation of his arm feeling light and is wondering if this is related to anxiety.  Patient reports his dog recently  about 3 months ago.    Patient otherwise denies chest pain, palpitations, orthopnea, PND, edema, shortness of breath or dizziness/lightheadedness.    He is having difficulty with the ED and is requesting help with this.    He continues to stay active, he boxes 3-4 times a week and walks 2 miles daily.    He reports his diastolic blood pressure stays around 90s.    Patient denies any smoking or drug use.  He does drinks occasional alcohol.    He denies any other medical history.    Family medical history of his father recently dying of multiple problems at age 65, but likely stemming from heart disease.  Patient is unsure of what diagnosis his father had.    Past Medical History:   Diagnosis Date    Family history of premature coronary artery disease 2022    Hypertension     Nil disease      History reviewed. No pertinent surgical history.  Family History   Problem Relation Age of Onset    Lymphoma Mother 53        Non-hodgkins     Social History     Socioeconomic History    Marital status:      Spouse name: Not on file    Number  of children: Not on file    Years of education: Not on file    Highest education level: Not on file   Occupational History    Not on file   Tobacco Use    Smoking status: Never    Smokeless tobacco: Never   Vaping Use    Vaping Use: Never used   Substance and Sexual Activity    Alcohol use: Yes     Alcohol/week: 3.0 oz     Types: 5 Standard drinks or equivalent per week     Comment: 4 drinks twice a week    Drug use: No    Sexual activity: Not on file   Other Topics Concern    Not on file   Social History Narrative    Not on file     Social Determinants of Health     Financial Resource Strain: Not on file   Food Insecurity: Not on file   Transportation Needs: Not on file   Physical Activity: Not on file   Stress: Not on file   Social Connections: Not on file   Intimate Partner Violence: Not on file   Housing Stability: Not on file     Allergies   Allergen Reactions    Ceclor [Cefaclor] Hives     Outpatient Encounter Medications as of 6/21/2023   Medication Sig Dispense Refill    tadalafil (CIALIS) 10 MG tablet Take 1 Tablet by mouth 1 time a day as needed for Erectile Dysfunction. 30 Tablet 11    lisinopril-hydrochlorothiazide (PRINZIDE) 20-12.5 MG per tablet Take 1 Tablet by mouth every day. 90 Tablet 3    [DISCONTINUED] lisinopril (PRINIVIL) 20 MG Tab Take 1 Tablet by mouth every day. 90 Tablet 3     No facility-administered encounter medications on file as of 6/21/2023.     Review of Systems   Constitutional:  Negative for fever and malaise/fatigue.   HENT:  Positive for congestion.    Respiratory:  Negative for cough and shortness of breath.    Cardiovascular:  Negative for chest pain, palpitations, orthopnea, claudication, leg swelling and PND.   Gastrointestinal:  Negative for abdominal pain.   Musculoskeletal:  Negative for myalgias.   Neurological:  Negative for dizziness.   All other systems reviewed and are negative.             Objective     BP (!) 133/94 (BP Location: Left arm, Patient Position: Sitting,  BP Cuff Size: Adult)   Pulse 70   Ht 1.829 m (6')   Wt 98.9 kg (218 lb)   BMI 29.57 kg/m²     Physical Exam  Vitals reviewed.   Constitutional:       Appearance: He is well-developed.   HENT:      Head: Normocephalic and atraumatic.   Eyes:      Pupils: Pupils are equal, round, and reactive to light.   Neck:      Vascular: No JVD.   Cardiovascular:      Rate and Rhythm: Normal rate and regular rhythm.      Heart sounds: Normal heart sounds.   Pulmonary:      Effort: Pulmonary effort is normal. No respiratory distress.      Breath sounds: Normal breath sounds. No wheezing or rales.   Abdominal:      General: Bowel sounds are normal.      Palpations: Abdomen is soft.   Musculoskeletal:         General: Normal range of motion.      Cervical back: Normal range of motion and neck supple.      Right lower leg: No edema.      Left lower leg: No edema.   Skin:     General: Skin is warm and dry.   Neurological:      General: No focal deficit present.      Mental Status: He is alert and oriented to person, place, and time.   Psychiatric:         Behavior: Behavior normal.       Lab Results   Component Value Date/Time    CHOLSTRLTOT 286 (H) 11/02/2022 04:23 AM    CHOLSTRLTOT 220 (H) 11/13/2017 04:19 PM    LDL see below 11/13/2017 04:19 PM    HDL 45 11/02/2022 04:23 AM    HDL 46 11/13/2017 04:19 PM    TRIGLYCERIDE 520 (H) 11/02/2022 04:23 AM    TRIGLYCERIDE 621 (H) 11/13/2017 04:19 PM       Lab Results   Component Value Date/Time    SODIUM 137 11/02/2022 04:23 AM    SODIUM 137 11/13/2017 04:19 PM    POTASSIUM 4.4 11/02/2022 04:23 AM    POTASSIUM 3.8 11/13/2017 04:19 PM    CHLORIDE 103 11/02/2022 04:23 AM    CHLORIDE 104 11/13/2017 04:19 PM    CO2 20 11/02/2022 04:23 AM    CO2 22 11/13/2017 04:19 PM    GLUCOSE 109 (H) 11/02/2022 04:23 AM    GLUCOSE 88 11/13/2017 04:19 PM    BUN 13 11/02/2022 04:23 AM    BUN 19 11/13/2017 04:19 PM    CREATININE 1.06 11/02/2022 04:23 AM    CREATININE 1.06 11/13/2017 04:19 PM    BUNCREATRAT 12  11/02/2022 04:23 AM     Lab Results   Component Value Date/Time    ALKPHOSPHAT 77 11/02/2022 04:23 AM    ALKPHOSPHAT 70 11/13/2017 04:19 PM    ASTSGOT 22 11/02/2022 04:23 AM    ASTSGOT 22 11/13/2017 04:19 PM    ALTSGPT 23 11/02/2022 04:23 AM    ALTSGPT 31 11/13/2017 04:19 PM    TBILIRUBIN 1.0 11/02/2022 04:23 AM    TBILIRUBIN 1.0 11/13/2017 04:19 PM      CT cardiac score 11/18/2022  FINDINGS:     Coronary calcification:  LMA - 0.0  LCX - 0.0  LAD - 29.6  RCA - 0.0     Total Calcium Score: 29.6     Percentile: Calcium score is above the 90th percentile for the patient's age and sex.     Other findings:  Heart: Normal size.  Lungs: Calcified granuloma in the right lower lobe.  Mediastinum: A large fat-containing morganii hernia. This mildly flattens the anterior aspect of the right ventricle..  Upper abdomen: Normal.     IMPRESSION:     Calcium Score of 1-99 AND >75th percentile:     You have significant cholesterol (plaque) build-up in the arteries that supply blood flow to your heart. This does not mean you have any blockages in your arteries that require an intervention at this time, but you are at higher risk of developing heart disease or a stroke. Therefore, you need to be aggressive about preventing further plaque build-up. We recommend treating this plaque with a healthy low saturated fat, low sugar, mostly plant based diet and regular exercise. We highly recommend the use   of aspirin (low dose, 81 mg once a day) and a cholesterol medication to help prevent further plaque build-up; please discuss these recommendations with your doctor. If you smoke, this likely has contributed to your cholesterol build-up, and you should quit as soon as possible. Please let your doctor know if you need medications or other resources to help you quit. If you are overweight, we also recommend gradual weight loss until you reach a normal weight. Each of these recommendations will lower your   future risk of heart disease and  stroke, and can even help decrease the amount of plaque you currently have. We do not routinely recommend any further testing based on this result.     Guidelines based upon the American College of Cardiology 2018 recommendations.       Transthoracic Echo Report 3/6/2023  Normal left ventricular size, wall thickness, and systolic function.  The right ventricle is normal in size and systolic function.  The left atrium is normal in size.  No pericardial effusion.     No prior study is available for comparison.            Assessment & Plan     1. Other male erectile dysfunction  tadalafil (CIALIS) 10 MG tablet      2. Primary hypertension  lisinopril-hydrochlorothiazide (PRINZIDE) 20-12.5 MG per tablet    Basic Metabolic Panel      3. Dyslipidemia        4. Family history of premature coronary artery disease            Medical Decision Making: Today's Assessment/Status/Plan:        Hypertension:  -BP goal < 120/80  -Recommend patient to start lisinopril-HCTZ 20-12.5 mg daily  -Stop lisinopril  -Monitor and log Blood pressures at home. Call office or RTC if BP increasing or >180/100 or if symptoms of elevated blood pressure present. Reviewed s/sx of stroke and heart attack. Patient to go to ER or call 911 if present.      Dyslipidemia:  -We will request lab tests again, if no significant improvement on cholesterol level, we will have to discuss starting patient on statin therapy.    Discussed with patient that his arm sensation could be related to anxiety, reviewed coronary CT scan.    For his erectile dysfunction, we will start Cialis 10 mg daily as needed 30 minutes prior to sexual intercourse.  Prescription sent to [facility].    Reviewed medications safe to take with hypertension.    Lab tests from March have not been received from [facility], will request again    Patient to repeat a BMP in 3 months    FU in clinic in 6 months.  If stable at next visit, we will send patient back to his PCP.  Sooner if  needed.    Patient verbalizes understanding and agrees with the plan of care.     PLEASE NOTE: This Note was created using voice recognition Software. I have made every reasonable attempt to correct obvious errors, but I expect that there are errors of grammar and possibly content that I did not discover before finalizing the note

## 2023-12-21 ENCOUNTER — APPOINTMENT (OUTPATIENT)
Dept: CARDIOLOGY | Facility: MEDICAL CENTER | Age: 38
End: 2023-12-21
Attending: NURSE PRACTITIONER
Payer: COMMERCIAL

## 2024-04-01 ENCOUNTER — TELEPHONE (OUTPATIENT)
Dept: CARDIOLOGY | Facility: MEDICAL CENTER | Age: 39
End: 2024-04-01
Payer: COMMERCIAL

## 2024-04-01 NOTE — TELEPHONE ENCOUNTER
Call placed to patient as a reminder for blood work to be completed prior to appt. No answer, LVM.

## 2024-04-08 ENCOUNTER — TELEMEDICINE (OUTPATIENT)
Dept: CARDIOLOGY | Facility: MEDICAL CENTER | Age: 39
End: 2024-04-08
Attending: NURSE PRACTITIONER
Payer: COMMERCIAL

## 2024-04-08 VITALS
WEIGHT: 230 LBS | DIASTOLIC BLOOD PRESSURE: 93 MMHG | HEART RATE: 75 BPM | BODY MASS INDEX: 31.15 KG/M2 | SYSTOLIC BLOOD PRESSURE: 135 MMHG | HEIGHT: 72 IN

## 2024-04-08 DIAGNOSIS — E78.5 DYSLIPIDEMIA: ICD-10-CM

## 2024-04-08 DIAGNOSIS — I10 PRIMARY HYPERTENSION: ICD-10-CM

## 2024-04-08 PROCEDURE — 99214 OFFICE O/P EST MOD 30 MIN: CPT | Mod: 95 | Performed by: NURSE PRACTITIONER

## 2024-04-08 RX ORDER — LISINOPRIL 40 MG/1
40 TABLET ORAL DAILY
Qty: 90 TABLET | Refills: 3 | Status: SHIPPED | OUTPATIENT
Start: 2024-04-08

## 2024-04-08 ASSESSMENT — ENCOUNTER SYMPTOMS
MYALGIAS: 0
CLAUDICATION: 0
ABDOMINAL PAIN: 0
FEVER: 0
SHORTNESS OF BREATH: 0
PND: 0
COUGH: 1
ORTHOPNEA: 0
DIZZINESS: 0
PALPITATIONS: 0

## 2024-04-08 ASSESSMENT — FIBROSIS 4 INDEX: FIB4 SCORE: 0.84

## 2024-04-08 NOTE — PATIENT INSTRUCTIONS
Complete labs    Stop Lisinopril-HCTZ    Start Lisinopril 40 mg daily. Can double up remaining prescription to use up medication.

## 2024-04-08 NOTE — PROGRESS NOTES
Chief Complaint   Patient presents with    Hypertension       Subjective   This evaluation was conducted via Zoom using secure and encrypted videoconferencing technology. The patient was in their home in the [facility].    The patient's identity was confirmed and verbal consent was obtained for this virtual visit.     Jaydon Oglesby is a 38 y.o. male who presents today for follow-up on his hypertension and family history of CAD.    He was last seen in clinic on 6/21/2023.  During that visit, he was recommended to start lisinopril/HCTZ 20-12.5 mg daily  Patient reports no problems with the dose increase of the medication. He mentions his BP has stayed about the same as the office BP.     He reports recent GI issues, lingering cough and Pink eye.    He did not get lab testing done.     Patient otherwise denies chest pain, palpitations, orthopnea, PND, edema, shortness of breath or dizziness/lightheadedness.    Patient denies any smoking or drug use.  He does drinks occasional alcohol.    He denies any other medical history.    Family medical history of his father recently dying of multiple problems at age 65, but likely stemming from heart disease.  Patient is unsure of what diagnosis his father had.    Past Medical History:   Diagnosis Date    Family history of premature coronary artery disease 01/26/2022    Hypertension     Nil disease      History reviewed. No pertinent surgical history.  Family History   Problem Relation Age of Onset    Lymphoma Mother 53        Non-hodgkins     Social History     Socioeconomic History    Marital status:      Spouse name: Not on file    Number of children: Not on file    Years of education: Not on file    Highest education level: Not on file   Occupational History    Not on file   Tobacco Use    Smoking status: Never    Smokeless tobacco: Never   Vaping Use    Vaping Use: Never used   Substance and Sexual Activity    Alcohol use: Yes     Alcohol/week: 3.0 oz     Types:  5 Standard drinks or equivalent per week     Comment: 4 drinks twice a week    Drug use: No    Sexual activity: Not on file   Other Topics Concern    Not on file   Social History Narrative    Not on file     Social Determinants of Health     Financial Resource Strain: Not on file   Food Insecurity: Not on file   Transportation Needs: Not on file   Physical Activity: Not on file   Stress: Not on file   Social Connections: Not on file   Intimate Partner Violence: Not on file   Housing Stability: Not on file     Allergies   Allergen Reactions    Ceclor [Cefaclor] Hives     Outpatient Encounter Medications as of 4/8/2024   Medication Sig Dispense Refill    lisinopril (PRINIVIL) 40 MG tablet Take 1 Tablet by mouth every day. 90 Tablet 3    tadalafil (CIALIS) 10 MG tablet Take 1 Tablet by mouth 1 time a day as needed for Erectile Dysfunction. 30 Tablet 11    [DISCONTINUED] lisinopril-hydrochlorothiazide (PRINZIDE) 20-12.5 MG per tablet Take 1 Tablet by mouth every day. 90 Tablet 3     No facility-administered encounter medications on file as of 4/8/2024.     Review of Systems   Constitutional:  Negative for fever and malaise/fatigue.   Respiratory:  Positive for cough. Negative for shortness of breath.    Cardiovascular:  Negative for chest pain, palpitations, orthopnea, claudication, leg swelling and PND.   Gastrointestinal:  Negative for abdominal pain.   Musculoskeletal:  Negative for myalgias.   Neurological:  Negative for dizziness.   All other systems reviewed and are negative.           Objective     BP (!) 135/93 (BP Location: Left arm, Patient Position: Sitting, BP Cuff Size: Adult)   Pulse 75   Ht 1.829 m (6')   Wt 104 kg (230 lb)   BMI 31.19 kg/m²     Physical Exam  Vitals reviewed.   Constitutional:       Appearance: He is well-developed.   HENT:      Head: Normocephalic and atraumatic.   Eyes:      Pupils: Pupils are equal, round, and reactive to light.   Neck:      Vascular: No JVD.   Cardiovascular:       Rate and Rhythm: Normal rate and regular rhythm.      Heart sounds: Normal heart sounds.   Pulmonary:      Effort: Pulmonary effort is normal. No respiratory distress.      Breath sounds: Normal breath sounds. No wheezing or rales.   Abdominal:      General: Bowel sounds are normal.      Palpations: Abdomen is soft.   Musculoskeletal:         General: Normal range of motion.      Cervical back: Normal range of motion and neck supple.      Right lower leg: No edema.      Left lower leg: No edema.   Skin:     General: Skin is warm and dry.   Neurological:      General: No focal deficit present.      Mental Status: He is alert and oriented to person, place, and time.   Psychiatric:         Behavior: Behavior normal.       Lab Results   Component Value Date/Time    CHOLSTRLTOT 286 (H) 11/02/2022 04:23 AM    CHOLSTRLTOT 220 (H) 11/13/2017 04:19 PM    LDL see below 11/13/2017 04:19 PM    HDL 45 11/02/2022 04:23 AM    HDL 46 11/13/2017 04:19 PM    TRIGLYCERIDE 520 (H) 11/02/2022 04:23 AM    TRIGLYCERIDE 621 (H) 11/13/2017 04:19 PM       Lab Results   Component Value Date/Time    SODIUM 137 11/02/2022 04:23 AM    SODIUM 137 11/13/2017 04:19 PM    POTASSIUM 4.4 11/02/2022 04:23 AM    POTASSIUM 3.8 11/13/2017 04:19 PM    CHLORIDE 103 11/02/2022 04:23 AM    CHLORIDE 104 11/13/2017 04:19 PM    CO2 20 11/02/2022 04:23 AM    CO2 22 11/13/2017 04:19 PM    GLUCOSE 109 (H) 11/02/2022 04:23 AM    GLUCOSE 88 11/13/2017 04:19 PM    BUN 13 11/02/2022 04:23 AM    BUN 19 11/13/2017 04:19 PM    CREATININE 1.06 11/02/2022 04:23 AM    CREATININE 1.06 11/13/2017 04:19 PM    BUNCREATRAT 12 11/02/2022 04:23 AM     Lab Results   Component Value Date/Time    ALKPHOSPHAT 77 11/02/2022 04:23 AM    ALKPHOSPHAT 70 11/13/2017 04:19 PM    ASTSGOT 22 11/02/2022 04:23 AM    ASTSGOT 22 11/13/2017 04:19 PM    ALTSGPT 23 11/02/2022 04:23 AM    ALTSGPT 31 11/13/2017 04:19 PM    TBILIRUBIN 1.0 11/02/2022 04:23 AM    TBILIRUBIN 1.0 11/13/2017 04:19 PM      CT  cardiac score 11/18/2022  FINDINGS:     Coronary calcification:  LMA - 0.0  LCX - 0.0  LAD - 29.6  RCA - 0.0     Total Calcium Score: 29.6     Percentile: Calcium score is above the 90th percentile for the patient's age and sex.     Other findings:  Heart: Normal size.  Lungs: Calcified granuloma in the right lower lobe.  Mediastinum: A large fat-containing morganii hernia. This mildly flattens the anterior aspect of the right ventricle..  Upper abdomen: Normal.     IMPRESSION:     Calcium Score of 1-99 AND >75th percentile:     You have significant cholesterol (plaque) build-up in the arteries that supply blood flow to your heart. This does not mean you have any blockages in your arteries that require an intervention at this time, but you are at higher risk of developing heart disease or a stroke. Therefore, you need to be aggressive about preventing further plaque build-up. We recommend treating this plaque with a healthy low saturated fat, low sugar, mostly plant based diet and regular exercise. We highly recommend the use   of aspirin (low dose, 81 mg once a day) and a cholesterol medication to help prevent further plaque build-up; please discuss these recommendations with your doctor. If you smoke, this likely has contributed to your cholesterol build-up, and you should quit as soon as possible. Please let your doctor know if you need medications or other resources to help you quit. If you are overweight, we also recommend gradual weight loss until you reach a normal weight. Each of these recommendations will lower your   future risk of heart disease and stroke, and can even help decrease the amount of plaque you currently have. We do not routinely recommend any further testing based on this result.     Guidelines based upon the American College of Cardiology 2018 recommendations.     Transthoracic Echo Report 3/6/2023  Normal left ventricular size, wall thickness, and systolic function.  The right ventricle is  normal in size and systolic function.  The left atrium is normal in size.  No pericardial effusion.     No prior study is available for comparison.            Assessment & Plan     1. Dyslipidemia  lisinopril (PRINIVIL) 40 MG tablet    Comp Metabolic Panel    Lipid Profile      2. Primary hypertension  lisinopril (PRINIVIL) 40 MG tablet    Comp Metabolic Panel    Lipid Profile            Medical Decision Making: Today's Assessment/Status/Plan:        Hypertension:  -BP goal < 120/80  -Stop lisinopril-HCTZ 20-12.5 mg daily  -Start lisinopril 40 mg daily, but can use it remaining doses of current prescription by doubling up.  -Monitor and log Blood pressures at home. Call office or RTC if BP increasing or >180/100 or if symptoms of elevated blood pressure present. Reviewed s/sx of stroke and heart attack. Patient to go to ER or call 911 if present.    -Patient reports he will get labs done next week, patient to changes BMP to CMP and lipid panel    Dyslipidemia:  -Repeat lipid panel at this time    He has no problems using his Cialis for his ED at this time.    FU in clinic in 3 months.  If stable at next visit, we will send patient back to his PCP.  Sooner if needed.    Patient verbalizes understanding and agrees with the plan of care.     PLEASE NOTE: This Note was created using voice recognition Software. I have made every reasonable attempt to correct obvious errors, but I expect that there are errors of grammar and possibly content that I did not discover before finalizing the note

## 2024-07-08 ENCOUNTER — APPOINTMENT (OUTPATIENT)
Dept: CARDIOLOGY | Facility: MEDICAL CENTER | Age: 39
End: 2024-07-08
Attending: NURSE PRACTITIONER
Payer: COMMERCIAL

## 2024-07-19 ENCOUNTER — TELEPHONE (OUTPATIENT)
Dept: CARDIOLOGY | Facility: MEDICAL CENTER | Age: 39
End: 2024-07-19
Payer: COMMERCIAL

## 2024-07-27 SDOH — ECONOMIC STABILITY: HOUSING INSECURITY
IN THE LAST 12 MONTHS, WAS THERE A TIME WHEN YOU DID NOT HAVE A STEADY PLACE TO SLEEP OR SLEPT IN A SHELTER (INCLUDING NOW)?: NO

## 2024-07-27 SDOH — HEALTH STABILITY: PHYSICAL HEALTH: ON AVERAGE, HOW MANY MINUTES DO YOU ENGAGE IN EXERCISE AT THIS LEVEL?: 60 MIN

## 2024-07-27 SDOH — HEALTH STABILITY: MENTAL HEALTH
STRESS IS WHEN SOMEONE FEELS TENSE, NERVOUS, ANXIOUS, OR CAN'T SLEEP AT NIGHT BECAUSE THEIR MIND IS TROUBLED. HOW STRESSED ARE YOU?: TO SOME EXTENT

## 2024-07-27 SDOH — ECONOMIC STABILITY: INCOME INSECURITY: IN THE LAST 12 MONTHS, WAS THERE A TIME WHEN YOU WERE NOT ABLE TO PAY THE MORTGAGE OR RENT ON TIME?: NO

## 2024-07-27 SDOH — ECONOMIC STABILITY: INCOME INSECURITY: HOW HARD IS IT FOR YOU TO PAY FOR THE VERY BASICS LIKE FOOD, HOUSING, MEDICAL CARE, AND HEATING?: NOT VERY HARD

## 2024-07-27 SDOH — ECONOMIC STABILITY: FOOD INSECURITY: WITHIN THE PAST 12 MONTHS, THE FOOD YOU BOUGHT JUST DIDN'T LAST AND YOU DIDN'T HAVE MONEY TO GET MORE.: NEVER TRUE

## 2024-07-27 SDOH — ECONOMIC STABILITY: TRANSPORTATION INSECURITY
IN THE PAST 12 MONTHS, HAS LACK OF TRANSPORTATION KEPT YOU FROM MEETINGS, WORK, OR FROM GETTING THINGS NEEDED FOR DAILY LIVING?: NO

## 2024-07-27 SDOH — ECONOMIC STABILITY: TRANSPORTATION INSECURITY
IN THE PAST 12 MONTHS, HAS THE LACK OF TRANSPORTATION KEPT YOU FROM MEDICAL APPOINTMENTS OR FROM GETTING MEDICATIONS?: NO

## 2024-07-27 SDOH — HEALTH STABILITY: PHYSICAL HEALTH: ON AVERAGE, HOW MANY DAYS PER WEEK DO YOU ENGAGE IN MODERATE TO STRENUOUS EXERCISE (LIKE A BRISK WALK)?: 4 DAYS

## 2024-07-27 SDOH — ECONOMIC STABILITY: FOOD INSECURITY: WITHIN THE PAST 12 MONTHS, YOU WORRIED THAT YOUR FOOD WOULD RUN OUT BEFORE YOU GOT MONEY TO BUY MORE.: NEVER TRUE

## 2024-07-27 SDOH — ECONOMIC STABILITY: TRANSPORTATION INSECURITY
IN THE PAST 12 MONTHS, HAS LACK OF RELIABLE TRANSPORTATION KEPT YOU FROM MEDICAL APPOINTMENTS, MEETINGS, WORK OR FROM GETTING THINGS NEEDED FOR DAILY LIVING?: NO

## 2024-07-27 SDOH — ECONOMIC STABILITY: HOUSING INSECURITY: IN THE LAST 12 MONTHS, HOW MANY PLACES HAVE YOU LIVED?: 1

## 2024-07-27 ASSESSMENT — SOCIAL DETERMINANTS OF HEALTH (SDOH)
HOW OFTEN DO YOU HAVE SIX OR MORE DRINKS ON ONE OCCASION: WEEKLY
HOW OFTEN DO YOU ATTENT MEETINGS OF THE CLUB OR ORGANIZATION YOU BELONG TO?: NEVER
HOW OFTEN DO YOU HAVE A DRINK CONTAINING ALCOHOL: 2-3 TIMES A WEEK
DO YOU BELONG TO ANY CLUBS OR ORGANIZATIONS SUCH AS CHURCH GROUPS UNIONS, FRATERNAL OR ATHLETIC GROUPS, OR SCHOOL GROUPS?: NO
HOW MANY DRINKS CONTAINING ALCOHOL DO YOU HAVE ON A TYPICAL DAY WHEN YOU ARE DRINKING: 3 OR 4
HOW OFTEN DO YOU ATTEND CHURCH OR RELIGIOUS SERVICES?: NEVER
DO YOU BELONG TO ANY CLUBS OR ORGANIZATIONS SUCH AS CHURCH GROUPS UNIONS, FRATERNAL OR ATHLETIC GROUPS, OR SCHOOL GROUPS?: NO
HOW HARD IS IT FOR YOU TO PAY FOR THE VERY BASICS LIKE FOOD, HOUSING, MEDICAL CARE, AND HEATING?: NOT VERY HARD
HOW OFTEN DO YOU GET TOGETHER WITH FRIENDS OR RELATIVES?: TWICE A WEEK
HOW OFTEN DO YOU ATTEND CHURCH OR RELIGIOUS SERVICES?: NEVER
IN THE PAST 12 MONTHS, HAS THE ELECTRIC, GAS, OIL, OR WATER COMPANY THREATENED TO SHUT OFF SERVICE IN YOUR HOME?: NO
IN A TYPICAL WEEK, HOW MANY TIMES DO YOU TALK ON THE PHONE WITH FAMILY, FRIENDS, OR NEIGHBORS?: TWICE A WEEK
IN A TYPICAL WEEK, HOW MANY TIMES DO YOU TALK ON THE PHONE WITH FAMILY, FRIENDS, OR NEIGHBORS?: TWICE A WEEK
WITHIN THE PAST 12 MONTHS, YOU WORRIED THAT YOUR FOOD WOULD RUN OUT BEFORE YOU GOT THE MONEY TO BUY MORE: NEVER TRUE
HOW OFTEN DO YOU GET TOGETHER WITH FRIENDS OR RELATIVES?: TWICE A WEEK
HOW OFTEN DO YOU ATTENT MEETINGS OF THE CLUB OR ORGANIZATION YOU BELONG TO?: NEVER

## 2024-07-27 ASSESSMENT — LIFESTYLE VARIABLES
HOW MANY STANDARD DRINKS CONTAINING ALCOHOL DO YOU HAVE ON A TYPICAL DAY: 3 OR 4
AUDIT-C TOTAL SCORE: 7
HOW OFTEN DO YOU HAVE A DRINK CONTAINING ALCOHOL: 2-3 TIMES A WEEK
SKIP TO QUESTIONS 9-10: 0
HOW OFTEN DO YOU HAVE SIX OR MORE DRINKS ON ONE OCCASION: WEEKLY

## 2024-07-29 ENCOUNTER — OFFICE VISIT (OUTPATIENT)
Dept: MEDICAL GROUP | Facility: CLINIC | Age: 39
End: 2024-07-29
Payer: COMMERCIAL

## 2024-07-29 VITALS
SYSTOLIC BLOOD PRESSURE: 132 MMHG | DIASTOLIC BLOOD PRESSURE: 94 MMHG | BODY MASS INDEX: 33.46 KG/M2 | HEART RATE: 131 BPM | WEIGHT: 247 LBS | TEMPERATURE: 97.9 F | HEIGHT: 72 IN | OXYGEN SATURATION: 99 %

## 2024-07-29 DIAGNOSIS — Z82.49 FAMILY HISTORY OF PREMATURE CORONARY ARTERY DISEASE: ICD-10-CM

## 2024-07-29 DIAGNOSIS — R74.8 ELEVATED LIVER ENZYMES: ICD-10-CM

## 2024-07-29 DIAGNOSIS — I10 PRIMARY HYPERTENSION: ICD-10-CM

## 2024-07-29 DIAGNOSIS — E78.5 DYSLIPIDEMIA: ICD-10-CM

## 2024-07-29 RX ORDER — DEXTROMETHORPHAN HYDROBROMIDE AND PROMETHAZINE HYDROCHLORIDE 15; 6.25 MG/5ML; MG/5ML
SYRUP ORAL
COMMUNITY
End: 2024-07-29

## 2024-07-29 RX ORDER — AMOXICILLIN 500 MG/1
CAPSULE ORAL
COMMUNITY
End: 2024-07-29

## 2024-07-29 RX ORDER — LISINOPRIL 10 MG/1
TABLET ORAL
COMMUNITY
End: 2024-07-29

## 2024-07-29 RX ORDER — ROSUVASTATIN CALCIUM 20 MG/1
20 TABLET, COATED ORAL EVERY EVENING
Qty: 30 TABLET | Refills: 11 | Status: SHIPPED | OUTPATIENT
Start: 2024-07-29

## 2024-07-29 RX ORDER — AZITHROMYCIN 250 MG/1
TABLET, FILM COATED ORAL
COMMUNITY
End: 2024-07-29

## 2024-07-29 ASSESSMENT — PATIENT HEALTH QUESTIONNAIRE - PHQ9
CLINICAL INTERPRETATION OF PHQ2 SCORE: 2
SUM OF ALL RESPONSES TO PHQ QUESTIONS 1-9: 6
5. POOR APPETITE OR OVEREATING: 1 - SEVERAL DAYS

## 2024-07-29 ASSESSMENT — ANXIETY QUESTIONNAIRES
6. BECOMING EASILY ANNOYED OR IRRITABLE: SEVERAL DAYS
1. FEELING NERVOUS, ANXIOUS, OR ON EDGE: SEVERAL DAYS
2. NOT BEING ABLE TO STOP OR CONTROL WORRYING: NOT AT ALL
5. BEING SO RESTLESS THAT IT IS HARD TO SIT STILL: NOT AT ALL
4. TROUBLE RELAXING: SEVERAL DAYS
3. WORRYING TOO MUCH ABOUT DIFFERENT THINGS: NOT AT ALL

## 2024-07-29 ASSESSMENT — FIBROSIS 4 INDEX: FIB4 SCORE: 1.55

## 2024-07-30 ENCOUNTER — APPOINTMENT (OUTPATIENT)
Dept: CARDIOLOGY | Facility: MEDICAL CENTER | Age: 39
End: 2024-07-30
Attending: NURSE PRACTITIONER
Payer: COMMERCIAL

## 2024-07-30 VITALS
HEART RATE: 130 BPM | HEIGHT: 72 IN | SYSTOLIC BLOOD PRESSURE: 132 MMHG | WEIGHT: 246 LBS | DIASTOLIC BLOOD PRESSURE: 94 MMHG | BODY MASS INDEX: 33.32 KG/M2

## 2024-07-30 DIAGNOSIS — E78.1 HIGH TRIGLYCERIDES: ICD-10-CM

## 2024-07-30 DIAGNOSIS — E78.5 DYSLIPIDEMIA: ICD-10-CM

## 2024-07-30 DIAGNOSIS — I10 PRIMARY HYPERTENSION: ICD-10-CM

## 2024-07-30 DIAGNOSIS — Z82.49 FAMILY HISTORY OF PREMATURE CORONARY ARTERY DISEASE: ICD-10-CM

## 2024-07-30 PROCEDURE — 99211 OFF/OP EST MAY X REQ PHY/QHP: CPT | Performed by: NURSE PRACTITIONER

## 2024-07-30 ASSESSMENT — FIBROSIS 4 INDEX: FIB4 SCORE: 1.55

## 2024-07-30 ASSESSMENT — ENCOUNTER SYMPTOMS
MYALGIAS: 0
CLAUDICATION: 0
DIZZINESS: 0
PND: 0
FEVER: 0
SHORTNESS OF BREATH: 0
ORTHOPNEA: 0
PALPITATIONS: 0
ABDOMINAL PAIN: 0

## 2024-12-18 ENCOUNTER — TELEPHONE (OUTPATIENT)
Dept: CARDIOLOGY | Facility: MEDICAL CENTER | Age: 39
End: 2024-12-18
Payer: COMMERCIAL

## 2024-12-30 ENCOUNTER — APPOINTMENT (OUTPATIENT)
Dept: CARDIOLOGY | Facility: MEDICAL CENTER | Age: 39
End: 2024-12-30
Attending: NURSE PRACTITIONER
Payer: COMMERCIAL

## 2025-02-08 ENCOUNTER — OFFICE VISIT (OUTPATIENT)
Dept: URGENT CARE | Facility: PHYSICIAN GROUP | Age: 40
End: 2025-02-08
Payer: COMMERCIAL

## 2025-02-08 VITALS
OXYGEN SATURATION: 97 % | SYSTOLIC BLOOD PRESSURE: 140 MMHG | HEART RATE: 114 BPM | BODY MASS INDEX: 32.8 KG/M2 | WEIGHT: 242.2 LBS | RESPIRATION RATE: 16 BRPM | TEMPERATURE: 97 F | DIASTOLIC BLOOD PRESSURE: 86 MMHG | HEIGHT: 72 IN

## 2025-02-08 DIAGNOSIS — J02.9 PHARYNGITIS, UNSPECIFIED ETIOLOGY: ICD-10-CM

## 2025-02-08 DIAGNOSIS — I10 HYPERTENSION, UNSPECIFIED TYPE: ICD-10-CM

## 2025-02-08 DIAGNOSIS — J06.9 VIRAL URI WITH COUGH: ICD-10-CM

## 2025-02-08 DIAGNOSIS — R00.0 TACHYCARDIA: ICD-10-CM

## 2025-02-08 LAB
FLUAV RNA SPEC QL NAA+PROBE: NEGATIVE
FLUBV RNA SPEC QL NAA+PROBE: NEGATIVE
RSV RNA SPEC QL NAA+PROBE: NEGATIVE
S PYO DNA SPEC NAA+PROBE: NOT DETECTED
SARS-COV-2 RNA RESP QL NAA+PROBE: NEGATIVE

## 2025-02-08 PROCEDURE — 99214 OFFICE O/P EST MOD 30 MIN: CPT

## 2025-02-08 PROCEDURE — 87651 STREP A DNA AMP PROBE: CPT

## 2025-02-08 PROCEDURE — 0241U POCT CEPHEID COV-2, FLU A/B, RSV - PCR: CPT

## 2025-02-08 PROCEDURE — 3079F DIAST BP 80-89 MM HG: CPT

## 2025-02-08 PROCEDURE — 3077F SYST BP >= 140 MM HG: CPT

## 2025-02-08 RX ORDER — BENZONATATE 200 MG/1
200 CAPSULE ORAL 3 TIMES DAILY PRN
Qty: 60 CAPSULE | Refills: 0 | Status: SHIPPED | OUTPATIENT
Start: 2025-02-08

## 2025-02-08 NOTE — PROGRESS NOTES
Subjective:   Jaydon Oglesby is a 39 y.o. male who presents for Pharyngitis (Exposed to strep, green mucous, sore throat, fever, chills, itchiness in throat started 2 days ago, all other Sx started yesterday )      HPI:    Patient presents to urgent care with concerns of itchy and sore throat  Symptoms started yesterday  Endorses rhinorrhea, nasal congestion, dry cough.   Report green nasal secretions  Reports subjective fever, chills.   Denies n/v, decreased appetite, but is tolerating diet  Reports sick contacts at home and work. His children have strep and he is a .   Denies rash  Normal urinary output  Denies wheezing, SOB, chest tightness.  Had asthma as a child    ROS As above in HPI    Medications:    Current Outpatient Medications on File Prior to Visit   Medication Sig Dispense Refill    rosuvastatin (CRESTOR) 20 MG Tab Take 1 Tablet by mouth every evening. 30 Tablet 11    lisinopril (PRINIVIL) 40 MG tablet Take 1 Tablet by mouth every day. 90 Tablet 3    tadalafil (CIALIS) 10 MG tablet Take 1 Tablet by mouth 1 time a day as needed for Erectile Dysfunction. 30 Tablet 11     No current facility-administered medications on file prior to visit.        Allergies:   Ceclor [cefaclor]    Problem List:   Patient Active Problem List   Diagnosis    Deviated nasal septum    Dyslipidemia    Hypertension    Family history of premature coronary artery disease    Family planning counseling    Elevated liver enzymes        Surgical History:  No past surgical history on file.    Past Social Hx:   Social History     Tobacco Use    Smoking status: Never    Smokeless tobacco: Never   Vaping Use    Vaping status: Never Used   Substance Use Topics    Alcohol use: Yes     Alcohol/week: 3.0 oz     Types: 5 Standard drinks or equivalent per week     Comment: 4 drinks twice a week    Drug use: No          Problem list, medications, and allergies reviewed by myself today in Epic.     Objective:     BP (!)  140/86 (BP Location: Right arm, Patient Position: Sitting, BP Cuff Size: Adult)   Pulse (!) 114   Temp 36.1 °C (97 °F) (Temporal)   Resp 16   Ht 1.829 m (6')   Wt 110 kg (242 lb 3.2 oz)   SpO2 97%   BMI 32.85 kg/m²     Physical Exam  Vitals and nursing note reviewed.   Constitutional:       General: He is not in acute distress.     Appearance: Normal appearance. He is not ill-appearing or diaphoretic.   HENT:      Head: Normocephalic.      Right Ear: Tympanic membrane and ear canal normal.      Left Ear: Tympanic membrane and ear canal normal.      Nose: Congestion and rhinorrhea present.      Mouth/Throat:      Mouth: Mucous membranes are moist.      Pharynx: Oropharynx is clear. Posterior oropharyngeal erythema present.   Cardiovascular:      Rate and Rhythm: Normal rate and regular rhythm.      Heart sounds: Normal heart sounds. No murmur heard.     No friction rub. No gallop.   Pulmonary:      Effort: Pulmonary effort is normal. No respiratory distress.      Breath sounds: Normal breath sounds. No stridor. No wheezing, rhonchi or rales.   Chest:      Chest wall: No tenderness.   Abdominal:      General: Abdomen is flat. Bowel sounds are normal.      Palpations: Abdomen is soft.   Musculoskeletal:      Cervical back: No rigidity or tenderness.   Lymphadenopathy:      Cervical: No cervical adenopathy.   Skin:     General: Skin is warm and dry.      Capillary Refill: Capillary refill takes less than 2 seconds.      Findings: No rash.   Neurological:      Mental Status: He is alert and oriented to person, place, and time.         Assessment/Plan:       Results for orders placed or performed in visit on 02/08/25   POCT CoV-2, Flu A/B, RSV by PCR    Collection Time: 02/08/25 10:27 AM   Result Value Ref Range    SARS-CoV-2 by PCR Negative Negative, Invalid    Influenza virus A RNA Negative Negative, Invalid    Influenza virus B, PCR Negative Negative, Invalid    RSV, PCR Negative Negative, Invalid   POCT CEPHEID  GROUP A STREP - PCR    Collection Time: 02/08/25 10:27 AM   Result Value Ref Range    POC Group A Strep, PCR Not Detected Not Detected, Invalid       Diagnosis and associated orders:   1. Viral URI with cough  - benzonatate (TESSALON) 200 MG capsule; Take 1 Capsule by mouth 3 times a day as needed for Cough.  Dispense: 60 Capsule; Refill: 0    2. Pharyngitis, unspecified etiology  - POCT CoV-2, Flu A/B, RSV by PCR  - POCT CEPHEID GROUP A STREP - PCR    3. Tachycardia    4. Hypertension, unspecified type        Comments/MDM:     Patient tested negative for COVID, influenza, RSV, strep in office.  Likely viral.  Patient's heart rate is a little elevated today, suspect he may be mildly dehydrated from decreased oral intake.  His blood pressure is also elevated, he has a history of hypertension.  Encouraged close monitoring of blood pressure at home.  No signs of respiratory distress.  Lungs are clear to auscultation all lobes bilaterally.  Tessalon ordered.  Supportive measures encouraged: Rest, increased oral hydration, NSAIDs/tylenol as needed per package instructions, decongestant, warm humidification, otc antihistamines, Flonase, cough suppressant as needed   Strict return to ER precautions reviewed  Follow-up with primary care advised  Work note provided        Return to clinic or go to ED if symptoms worsen or persist. Indications for ED discussed at length. Patient/Parent/Guardian voices understanding. Follow-up with your primary care provider in 3-5 days. Red flag symptoms discussed. All side effects of medication discussed including allergic response, GI upset, tendon injury, rash, sedation etc.    Please note that this dictation was created using voice recognition software. I have made a reasonable attempt to correct obvious errors, but I expect that there are errors of grammar and possibly content that I did not discover before finalizing the note.    This note was electronically signed by Francia QUIÑONEZ  Dwayne, APRN

## 2025-02-08 NOTE — LETTER
February 8, 2025    To Whom It May Concern:         This is confirmation that Jaydon Oglesby attended his scheduled appointment with LUCAS Laughlin on 2/08/25.    Please excuse patient from work until he is free of fever for 24 hours without the use of Tylenol or ibuprofen.         If you have any questions please do not hesitate to call me at the phone number listed below.    Sincerely,      SALONI Laughlin.  [phone]

## 2025-04-20 ENCOUNTER — APPOINTMENT (OUTPATIENT)
Dept: URGENT CARE | Facility: PHYSICIAN GROUP | Age: 40
End: 2025-04-20
Payer: COMMERCIAL

## 2025-04-20 ENCOUNTER — OFFICE VISIT (OUTPATIENT)
Dept: URGENT CARE | Facility: PHYSICIAN GROUP | Age: 40
End: 2025-04-20
Payer: COMMERCIAL

## 2025-04-20 ENCOUNTER — HOSPITAL ENCOUNTER (OUTPATIENT)
Dept: RADIOLOGY | Facility: MEDICAL CENTER | Age: 40
End: 2025-04-20
Attending: FAMILY MEDICINE
Payer: COMMERCIAL

## 2025-04-20 VITALS
BODY MASS INDEX: 32.7 KG/M2 | HEIGHT: 72 IN | DIASTOLIC BLOOD PRESSURE: 88 MMHG | WEIGHT: 241.4 LBS | SYSTOLIC BLOOD PRESSURE: 140 MMHG | OXYGEN SATURATION: 98 % | RESPIRATION RATE: 18 BRPM | HEART RATE: 121 BPM | TEMPERATURE: 97.7 F

## 2025-04-20 DIAGNOSIS — R00.0 TACHYCARDIA: ICD-10-CM

## 2025-04-20 DIAGNOSIS — M79.674 PAIN OF TOE OF RIGHT FOOT: ICD-10-CM

## 2025-04-20 DIAGNOSIS — R03.0 ELEVATED BLOOD PRESSURE READING: ICD-10-CM

## 2025-04-20 PROCEDURE — 3077F SYST BP >= 140 MM HG: CPT | Performed by: FAMILY MEDICINE

## 2025-04-20 PROCEDURE — 99214 OFFICE O/P EST MOD 30 MIN: CPT | Performed by: FAMILY MEDICINE

## 2025-04-20 PROCEDURE — 3079F DIAST BP 80-89 MM HG: CPT | Performed by: FAMILY MEDICINE

## 2025-04-20 PROCEDURE — 73660 X-RAY EXAM OF TOE(S): CPT | Mod: RT

## 2025-04-20 RX ORDER — METHYLPREDNISOLONE 4 MG/1
TABLET ORAL
Qty: 21 TABLET | Refills: 0 | Status: SHIPPED | OUTPATIENT
Start: 2025-04-20

## 2025-04-20 NOTE — PROGRESS NOTES
Subjective:      39 y.o. male presents to urgent care for right great toe pain that started Friday night.  He thinks he may have slept with his foot in a weird position against the footboard, otherwise no inciting event or trauma.  Pain has been constant since then, is described as throbbing, currently rated 3/10.  He has tried Tylenol with some relief in symptoms.    Heart rate and blood pressure are elevated today in urgent care. He does have a history of hypertension for which she takes lisinopril.  He denies any chest pain, palpitations, or shortness of breath.    He denies any other questions or concerns at this time.    Current problem list, medication, and past medical/surgical history were reviewed in Epic.    ROS  See HPI     Objective:      BP (!) 140/88 (BP Location: Left arm, Patient Position: Sitting, BP Cuff Size: Adult long)   Pulse (!) 121   Temp 36.5 °C (97.7 °F) (Temporal)   Resp 18   Ht 1.829 m (6')   Wt 110 kg (241 lb 6.5 oz)   SpO2 98%   BMI 32.74 kg/m²     Physical Exam  Constitutional:       General: He is not in acute distress.     Appearance: He is not diaphoretic.   Cardiovascular:      Rate and Rhythm: Regular rhythm. Tachycardia present.      Heart sounds: Normal heart sounds.   Pulmonary:      Effort: Pulmonary effort is normal. No respiratory distress.      Breath sounds: Normal breath sounds.   Musculoskeletal:      Comments: Right great toe is edematous without any erythema.  He is tender to palpation at the base of his toe.  He is still able to plantar and dorsiflex against resistance.  Antalgic gait.   Neurological:      Mental Status: He is alert.   Psychiatric:         Mood and Affect: Affect normal.         Judgment: Judgment normal.       Assessment/Plan:     1. Pain of toe of right foot  XRAY showing no acute osseous abnormalities.  Questionable gout.  Medrol Dosepak has been sent.  He was encouraged to use Tylenol as needed for symptomatic relief.  - DX-TOE(S) 2+  RIGHT; Future  - methylPREDNISolone (MEDROL DOSEPAK) 4 MG Tablet Therapy Pack; Follow schedule on package instructions.  Dispense: 21 Tablet; Refill: 0    2. Elevated blood pressure reading  3. Tachycardia  Systemic symptoms seen through tachycardia and elevated blood pressure.  He was encouraged to continue with lisinopril as prescribed.  Follow with PCP.       Instructed to return to Urgent Care or nearest Emergency Department if symptoms fail to improve, for any change in condition, further concerns, or new concerning symptoms. Patient states understanding of the plan of care and discharge instructions.    Kathy Szymanski M.D.

## 2025-05-05 ENCOUNTER — APPOINTMENT (OUTPATIENT)
Dept: MEDICAL GROUP | Facility: CLINIC | Age: 40
End: 2025-05-05
Payer: COMMERCIAL

## 2025-05-05 DIAGNOSIS — M25.352 HIP INSTABILITY, LEFT: ICD-10-CM

## 2025-05-05 DIAGNOSIS — M10.071 ACUTE IDIOPATHIC GOUT INVOLVING TOE OF RIGHT FOOT: ICD-10-CM

## 2025-05-05 DIAGNOSIS — M25.359 INSTABILITY OF HIP JOINT, UNSPECIFIED LATERALITY: ICD-10-CM

## 2025-05-05 DIAGNOSIS — I10 PRIMARY HYPERTENSION: ICD-10-CM

## 2025-05-05 PROCEDURE — 3075F SYST BP GE 130 - 139MM HG: CPT | Mod: GC

## 2025-05-05 PROCEDURE — 3078F DIAST BP <80 MM HG: CPT | Mod: GC

## 2025-05-05 PROCEDURE — 99214 OFFICE O/P EST MOD 30 MIN: CPT | Mod: GC

## 2025-05-05 SDOH — ECONOMIC STABILITY: INCOME INSECURITY: IN THE LAST 12 MONTHS, WAS THERE A TIME WHEN YOU WERE NOT ABLE TO PAY THE MORTGAGE OR RENT ON TIME?: NO

## 2025-05-05 SDOH — HEALTH STABILITY: PHYSICAL HEALTH: ON AVERAGE, HOW MANY MINUTES DO YOU ENGAGE IN EXERCISE AT THIS LEVEL?: 40 MIN

## 2025-05-05 SDOH — HEALTH STABILITY: PHYSICAL HEALTH: ON AVERAGE, HOW MANY DAYS PER WEEK DO YOU ENGAGE IN MODERATE TO STRENUOUS EXERCISE (LIKE A BRISK WALK)?: 4 DAYS

## 2025-05-05 SDOH — ECONOMIC STABILITY: INCOME INSECURITY: HOW HARD IS IT FOR YOU TO PAY FOR THE VERY BASICS LIKE FOOD, HOUSING, MEDICAL CARE, AND HEATING?: SOMEWHAT HARD

## 2025-05-05 SDOH — ECONOMIC STABILITY: FOOD INSECURITY: WITHIN THE PAST 12 MONTHS, YOU WORRIED THAT YOUR FOOD WOULD RUN OUT BEFORE YOU GOT MONEY TO BUY MORE.: NEVER TRUE

## 2025-05-05 SDOH — ECONOMIC STABILITY: FOOD INSECURITY: WITHIN THE PAST 12 MONTHS, THE FOOD YOU BOUGHT JUST DIDN'T LAST AND YOU DIDN'T HAVE MONEY TO GET MORE.: NEVER TRUE

## 2025-05-05 SDOH — HEALTH STABILITY: MENTAL HEALTH
STRESS IS WHEN SOMEONE FEELS TENSE, NERVOUS, ANXIOUS, OR CAN'T SLEEP AT NIGHT BECAUSE THEIR MIND IS TROUBLED. HOW STRESSED ARE YOU?: ONLY A LITTLE

## 2025-05-05 ASSESSMENT — SOCIAL DETERMINANTS OF HEALTH (SDOH)
HOW OFTEN DO YOU ATTEND CHURCH OR RELIGIOUS SERVICES?: NEVER
HOW OFTEN DO YOU HAVE A DRINK CONTAINING ALCOHOL: 2-3 TIMES A WEEK
HOW OFTEN DO YOU GET TOGETHER WITH FRIENDS OR RELATIVES?: ONCE A WEEK
IN A TYPICAL WEEK, HOW MANY TIMES DO YOU TALK ON THE PHONE WITH FAMILY, FRIENDS, OR NEIGHBORS?: NEVER
HOW OFTEN DO YOU ATTENT MEETINGS OF THE CLUB OR ORGANIZATION YOU BELONG TO?: MORE THAN 4 TIMES PER YEAR
WITHIN THE PAST 12 MONTHS, YOU WORRIED THAT YOUR FOOD WOULD RUN OUT BEFORE YOU GOT THE MONEY TO BUY MORE: NEVER TRUE
DO YOU BELONG TO ANY CLUBS OR ORGANIZATIONS SUCH AS CHURCH GROUPS UNIONS, FRATERNAL OR ATHLETIC GROUPS, OR SCHOOL GROUPS?: YES
IN THE PAST 12 MONTHS, HAS THE ELECTRIC, GAS, OIL, OR WATER COMPANY THREATENED TO SHUT OFF SERVICE IN YOUR HOME?: NO
HOW HARD IS IT FOR YOU TO PAY FOR THE VERY BASICS LIKE FOOD, HOUSING, MEDICAL CARE, AND HEATING?: SOMEWHAT HARD
HOW OFTEN DO YOU ATTENT MEETINGS OF THE CLUB OR ORGANIZATION YOU BELONG TO?: MORE THAN 4 TIMES PER YEAR
HOW MANY DRINKS CONTAINING ALCOHOL DO YOU HAVE ON A TYPICAL DAY WHEN YOU ARE DRINKING: 3 OR 4
DO YOU BELONG TO ANY CLUBS OR ORGANIZATIONS SUCH AS CHURCH GROUPS UNIONS, FRATERNAL OR ATHLETIC GROUPS, OR SCHOOL GROUPS?: YES
HOW OFTEN DO YOU ATTEND CHURCH OR RELIGIOUS SERVICES?: NEVER
HOW OFTEN DO YOU HAVE SIX OR MORE DRINKS ON ONE OCCASION: MONTHLY
IN A TYPICAL WEEK, HOW MANY TIMES DO YOU TALK ON THE PHONE WITH FAMILY, FRIENDS, OR NEIGHBORS?: NEVER
HOW OFTEN DO YOU GET TOGETHER WITH FRIENDS OR RELATIVES?: ONCE A WEEK

## 2025-05-05 ASSESSMENT — LIFESTYLE VARIABLES
HOW OFTEN DO YOU HAVE A DRINK CONTAINING ALCOHOL: 2-3 TIMES A WEEK
AUDIT-C TOTAL SCORE: 6
HOW OFTEN DO YOU HAVE SIX OR MORE DRINKS ON ONE OCCASION: MONTHLY
HOW MANY STANDARD DRINKS CONTAINING ALCOHOL DO YOU HAVE ON A TYPICAL DAY: 3 OR 4
SKIP TO QUESTIONS 9-10: 0

## 2025-05-05 ASSESSMENT — PATIENT HEALTH QUESTIONNAIRE - PHQ9: CLINICAL INTERPRETATION OF PHQ2 SCORE: 1
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
## 2025-05-06 ENCOUNTER — RESULTS FOLLOW-UP (OUTPATIENT)
Dept: CARDIOLOGY | Facility: MEDICAL CENTER | Age: 40
End: 2025-05-06

## 2025-05-06 VITALS
SYSTOLIC BLOOD PRESSURE: 136 MMHG | RESPIRATION RATE: 16 BRPM | HEART RATE: 105 BPM | BODY MASS INDEX: 32.23 KG/M2 | DIASTOLIC BLOOD PRESSURE: 89 MMHG | TEMPERATURE: 98.5 F | HEIGHT: 72 IN | WEIGHT: 238 LBS | OXYGEN SATURATION: 100 %

## 2025-05-06 PROBLEM — M10.071 ACUTE IDIOPATHIC GOUT INVOLVING TOE OF RIGHT FOOT: Status: ACTIVE | Noted: 2025-05-06

## 2025-05-10 NOTE — Clinical Note
REFERRAL APPROVAL NOTICE         Sent on May 9, 2025                   Jaydon Oglesby  [address] [address]                   Dear Mr. Oglesby,    After a careful review of the medical information and benefit coverage, [facility] has processed your referral. See below for additional details.    If applicable, you must be actively enrolled with your insurance for coverage of the authorized service. If you have any questions regarding your coverage, please contact your insurance directly.    REFERRAL INFORMATION   Referral #:  [id number]  Referred-To Department    Referred-By Provider:  Physical Therapy    Francia Borjas M.D.   [facility]      [address] [address]  [phone] [address]  [facility] [id number] [phone]    Referral Start Date:  05/05/2025  Referral End Date:   05/05/2026             SCHEDULING  If you do not already have an appointment, please call [phone] to make an appointment.     MORE INFORMATION  If you do not already have a [facility] account, sign up at: [email or web address]  You can access your medical information, make appointments, see lab results, billing information, and more.  If you have questions regarding this referral, please contact  the [facility] Referrals department at:             [phone]. Monday - Friday 8:00AM - 5:00PM.     Sincerely,    [facility]

## 2025-05-12 DIAGNOSIS — E78.5 DYSLIPIDEMIA: ICD-10-CM

## 2025-05-12 DIAGNOSIS — I10 PRIMARY HYPERTENSION: ICD-10-CM

## 2025-05-13 RX ORDER — LISINOPRIL 40 MG/1
40 TABLET ORAL DAILY
Qty: 90 TABLET | Refills: 0 | Status: SHIPPED | OUTPATIENT
Start: 2025-05-13

## 2025-05-13 NOTE — TELEPHONE ENCOUNTER
Is the patient due for a refill? Yes    Was the patient seen the last 15 months? Yes    Date of last office visit: 7/30/24    Does the patient have an upcoming appointment?  No   If yes, When?     Provider to refill:MAR    Does the patient have long term Plus and need 100-day supply? (This applies to ALL medications) Patient does not have SCP

## 2025-05-25 ENCOUNTER — OFFICE VISIT (OUTPATIENT)
Dept: URGENT CARE | Facility: PHYSICIAN GROUP | Age: 40
End: 2025-05-25
Payer: COMMERCIAL

## 2025-05-25 VITALS
TEMPERATURE: 97 F | OXYGEN SATURATION: 99 % | DIASTOLIC BLOOD PRESSURE: 94 MMHG | WEIGHT: 231 LBS | HEIGHT: 72 IN | RESPIRATION RATE: 16 BRPM | HEART RATE: 125 BPM | BODY MASS INDEX: 31.29 KG/M2 | SYSTOLIC BLOOD PRESSURE: 166 MMHG

## 2025-05-25 DIAGNOSIS — R03.0 ELEVATED BLOOD PRESSURE READING: ICD-10-CM

## 2025-05-25 DIAGNOSIS — R00.0 TACHYCARDIA: ICD-10-CM

## 2025-05-25 DIAGNOSIS — H65.91 RIGHT NON-SUPPURATIVE OTITIS MEDIA: Primary | ICD-10-CM

## 2025-05-25 PROCEDURE — 3077F SYST BP >= 140 MM HG: CPT

## 2025-05-25 PROCEDURE — 3080F DIAST BP >= 90 MM HG: CPT

## 2025-05-25 PROCEDURE — 99213 OFFICE O/P EST LOW 20 MIN: CPT

## 2025-05-25 ASSESSMENT — ENCOUNTER SYMPTOMS
SORE THROAT: 1
BLURRED VISION: 0
DIARRHEA: 1
NAUSEA: 0
PHOTOPHOBIA: 0
FOCAL WEAKNESS: 0
HEADACHES: 1
EYE REDNESS: 0
EYE PAIN: 0
EYE DISCHARGE: 0
WEAKNESS: 0
ABDOMINAL PAIN: 0
STRIDOR: 0
SINUS PAIN: 0
CHILLS: 1
NECK PAIN: 0
WHEEZING: 0
DIZZINESS: 0
DOUBLE VISION: 0
SHORTNESS OF BREATH: 0
SPUTUM PRODUCTION: 0
MYALGIAS: 1
COUGH: 1
VOMITING: 0

## 2025-05-25 ASSESSMENT — VISUAL ACUITY: OU: 1

## 2025-05-25 NOTE — PROGRESS NOTES
Subjective     Jaydon Oglesby is a 40 y.o. male who presents with Sinus Problem (R ear pain xWednesday)    HPI:   Jaydon is a 39yo male presenting for right ear pain x4 days. Reports associated nasal congestion, intermittent pressure headache, sore throat, and cough. Denies abdominal pain or vomiting. No fever. Denies shortness of breath or increased work of breathing. Denies dysphagia or difficulty managing oral secretions. No ear discharge. He has attempted OTC otic suspension for relief.      Review of Systems   Constitutional:  Positive for chills and malaise/fatigue.   HENT:  Positive for congestion, ear pain and sore throat. Negative for ear discharge and sinus pain.    Eyes:  Negative for blurred vision, double vision, photophobia, pain, discharge and redness.   Respiratory:  Positive for cough. Negative for sputum production, shortness of breath, wheezing and stridor.    Gastrointestinal:  Positive for diarrhea. Negative for abdominal pain, nausea and vomiting.   Musculoskeletal:  Positive for myalgias. Negative for neck pain.   Skin:  Negative for rash.   Neurological:  Positive for headaches. Negative for dizziness, focal weakness and weakness.     Past Medical History:  Diagnosis Date    Family history of premature coronary artery disease 01/26/2022    Hypertension     Nil disease      History reviewed. No pertinent surgical history.     Allergies: Ceclor [cefaclor]     Current Outpatient Medications:     lisinopril (PRINIVIL) 40 MG tablet, Take 1 Tablet by mouth every day., Disp: 90 Tablet, Rfl: 0    rosuvastatin (CRESTOR) 20 MG Tab, Take 1 Tablet by mouth every evening., Disp: 30 Tablet, Rfl: 11    Social History  Tobacco Use    Smoking status: Never    Smokeless tobacco: Never   Vaping Use    Vaping status: Never Used   Substance Use Topics    Alcohol use: Yes     Alcohol/week: 3.0 oz     Types: 5 Standard drinks or equivalent per week     Comment: 4 drinks twice a week    Drug use: No     Family  History   Problem Relation Age of Onset    Lymphoma Mother 53        Non-hodgkins     Medications, Allergies, and current problem list reviewed today in Epic.      Objective     BP (!) 166/94   Pulse (!) 125   Temp 36.1 °C (97 °F)   Resp 16   Ht 1.829 m (6')   Wt 105 kg (231 lb)   SpO2 99%   BMI 31.33 kg/m²      Physical Exam  Vitals reviewed.   Constitutional:       General: He is not in acute distress.  HENT:      Head: No right periorbital erythema or left periorbital erythema.      Jaw: There is normal jaw occlusion. No tenderness, swelling, pain on movement or malocclusion.      Salivary Glands: Right salivary gland is not diffusely enlarged or tender. Left salivary gland is not diffusely enlarged or tender.      Right Ear: Ear canal and external ear normal. A middle ear effusion is present. Tympanic membrane is erythematous and bulging.      Left Ear: Ear canal and external ear normal. Tympanic membrane is not erythematous or bulging.      Nose: Nose normal.      Right Sinus: No maxillary sinus tenderness or frontal sinus tenderness.      Left Sinus: No maxillary sinus tenderness or frontal sinus tenderness.      Mouth/Throat:      Lips: No lesions.      Mouth: Mucous membranes are moist. No oral lesions or angioedema.      Tongue: No lesions. Tongue does not deviate from midline.      Palate: No mass and lesions.      Pharynx: Uvula midline. No oropharyngeal exudate, posterior oropharyngeal erythema or uvula swelling.      Tonsils: No tonsillar abscesses.   Eyes:      General: Vision grossly intact. Gaze aligned appropriately. No visual field deficit.     Extraocular Movements: Extraocular movements intact.      Conjunctiva/sclera: Conjunctivae normal.      Pupils: Pupils are equal, round, and reactive to light.      Right eye: Pupil is round, reactive and not sluggish.      Left eye: Pupil is round, reactive and not sluggish.      Funduscopic exam:     Right eye: Red reflex present.         Left eye:  Red reflex present.  Neck:      Trachea: Trachea normal. No abnormal tracheal secretions or tracheal deviation.   Cardiovascular:      Rate and Rhythm: Normal rate and regular rhythm.      Pulses: Normal pulses.      Heart sounds: Normal heart sounds.   Pulmonary:      Effort: Pulmonary effort is normal. No tachypnea, accessory muscle usage, prolonged expiration, respiratory distress or retractions.      Breath sounds: Normal breath sounds. No stridor, decreased air movement or transmitted upper airway sounds. No wheezing, rhonchi or rales.   Musculoskeletal:         General: Normal range of motion.      Cervical back: Full passive range of motion without pain, normal range of motion and neck supple. No erythema, rigidity or crepitus. No pain with movement. Normal range of motion.   Skin:     General: Skin is warm and dry.   Neurological:      Mental Status: He is alert. Mental status is at baseline.      Sensory: Sensation is intact.      Motor: Motor function is intact.      Coordination: Coordination is intact.      Gait: Gait is intact.   Psychiatric:         Mood and Affect: Mood normal.         Behavior: Behavior normal.         Thought Content: Thought content normal.       Assessment & Plan    1. Right non-suppurative otitis media (Primary)   - amoxicillin-clavulanate (AUGMENTIN) 875-125 MG Tab; Take 1 Tablet by mouth 2 times a day for 7 days.  Dispense: 14 Tablet; Refill: 0    2. Elevated blood pressure reading     3. Tachycardia      Systemic symptoms seen through tachycardia and elevated blood pressure. He was encouraged to continue with lisinopril as prescribed. Follow with PCP.        MDM/Comments:   History and examination consistent with acute otitis media of right ear, likely secondary to viral URI. Right ear exam abnormal with erythematous, bulging tympanic membrane without perforation. Patient will be prescribed Augmentin BID x7 days.     Recommended supportive treatment at home:     - Use a  humidifier, especially at night. Cold or warm water humidifiers have the same effect    - Esteban Med squeeze bottle sinus rinses or plain nasal saline twice a day   - Hot tea + honey + fresh lemon juice  - Frequent hand washing, rest, hydration  - Warm salt water gargles at least twice a day       Follow up with primary care provider. Follow up urgently for worsening symptoms, persistent fevers, facial swelling, visual changes, weakness, elevated heart rate, stiff neck, prolonged cough, persistent wheezing, leg swelling, or any other concerns. Seek emergency medical care immediately for: Trouble breathing, persistent pain or pressure in the chest, confusion, inability to wake or stay awake, bluish lips or face, persistent tachycardia (fast heart rate), prolonged dizziness, persistent high grade fevers.       Illness progression and alarm symptoms discussed with patient, emphasizing low threshold for returning to clinic/emergency department for worsening symptoms. Patient is agreeable to the plan and verbalizes understanding, and will follow up if warranted.        Differential diagnosis, natural history, supportive care, and indications for immediate follow-up discussed.       Follow-up as needed if symptoms worsen or fail to improve to PCP, Urgent care or Emergency Room.                                               Electronically signed by JAROD Canales

## 2025-06-30 DIAGNOSIS — E78.5 DYSLIPIDEMIA: ICD-10-CM

## 2025-06-30 RX ORDER — ROSUVASTATIN CALCIUM 20 MG/1
20 TABLET, COATED ORAL EVERY EVENING
Qty: 30 TABLET | Refills: 11 | Status: SHIPPED | OUTPATIENT
Start: 2025-06-30

## 2025-06-30 NOTE — TELEPHONE ENCOUNTER
Received request via: Patient    Was the patient seen in the last year in this department? Yes    Does the patient have an active prescription (recently filled or refills available) for medication(s) requested? No    Pharmacy Name:   [id number] - [place] - [address]  [address] [place] [address]  Phone: [phone] Fax: [phone]        Does the patient have residential Plus and need 100-day supply? (This applies to ALL medications) Patient does not have SCP

## 2025-07-17 ENCOUNTER — APPOINTMENT (OUTPATIENT)
Dept: MEDICAL GROUP | Facility: CLINIC | Age: 40
End: 2025-07-17
Payer: COMMERCIAL

## 2025-08-04 ENCOUNTER — APPOINTMENT (OUTPATIENT)
Dept: RADIOLOGY | Facility: OTHER | Age: 40
End: 2025-08-04
Attending: FAMILY MEDICINE
Payer: COMMERCIAL

## 2025-08-04 ENCOUNTER — OFFICE VISIT (OUTPATIENT)
Dept: MEDICAL GROUP | Facility: OTHER | Age: 40
End: 2025-08-04
Payer: COMMERCIAL

## 2025-08-04 VITALS
OXYGEN SATURATION: 97 % | WEIGHT: 231 LBS | HEART RATE: 115 BPM | TEMPERATURE: 97.4 F | HEIGHT: 72 IN | BODY MASS INDEX: 31.29 KG/M2

## 2025-08-04 DIAGNOSIS — M25.551 HIP PAIN, BILATERAL: Primary | ICD-10-CM

## 2025-08-04 DIAGNOSIS — M25.552 HIP PAIN, BILATERAL: Primary | ICD-10-CM

## 2025-08-04 PROCEDURE — 73521 X-RAY EXAM HIPS BI 2 VIEWS: CPT | Mod: TC,FY | Performed by: FAMILY MEDICINE

## 2025-08-04 PROCEDURE — 99213 OFFICE O/P EST LOW 20 MIN: CPT | Performed by: FAMILY MEDICINE

## 2025-08-07 PROBLEM — M25.551 HIP PAIN, BILATERAL: Status: ACTIVE | Noted: 2025-08-07

## 2025-08-07 PROBLEM — M25.552 HIP PAIN, BILATERAL: Status: ACTIVE | Noted: 2025-08-07

## 2025-08-07 ASSESSMENT — ENCOUNTER SYMPTOMS
CARDIOVASCULAR NEGATIVE: 1
RESPIRATORY NEGATIVE: 1
GASTROINTESTINAL NEGATIVE: 1
NEUROLOGICAL NEGATIVE: 1
EYES NEGATIVE: 1
CONSTITUTIONAL NEGATIVE: 1
PSYCHIATRIC NEGATIVE: 1

## 2025-08-13 DIAGNOSIS — E78.5 DYSLIPIDEMIA: ICD-10-CM

## 2025-08-13 DIAGNOSIS — I10 PRIMARY HYPERTENSION: ICD-10-CM

## 2025-08-14 RX ORDER — LISINOPRIL 40 MG/1
40 TABLET ORAL DAILY
Qty: 90 TABLET | Refills: 0 | Status: SHIPPED | OUTPATIENT
Start: 2025-08-14